# Patient Record
Sex: FEMALE | Race: WHITE | NOT HISPANIC OR LATINO | URBAN - METROPOLITAN AREA
[De-identification: names, ages, dates, MRNs, and addresses within clinical notes are randomized per-mention and may not be internally consistent; named-entity substitution may affect disease eponyms.]

---

## 2023-10-05 ENCOUNTER — TELEPHONE (OUTPATIENT)
Dept: PRIMARY CARE | Facility: CLINIC | Age: 38
End: 2023-10-05
Payer: COMMERCIAL

## 2023-10-05 DIAGNOSIS — F98.8 ATTENTION DEFICIT DISORDER, UNSPECIFIED HYPERACTIVITY PRESENCE: Primary | ICD-10-CM

## 2023-10-05 RX ORDER — DEXTROAMPHETAMINE SACCHARATE, AMPHETAMINE ASPARTATE MONOHYDRATE, DEXTROAMPHETAMINE SULFATE AND AMPHETAMINE SULFATE 6.25; 6.25; 6.25; 6.25 MG/1; MG/1; MG/1; MG/1
25 CAPSULE, EXTENDED RELEASE ORAL EVERY MORNING
Qty: 90 CAPSULE | Refills: 0 | Status: SHIPPED | OUTPATIENT
Start: 2023-10-05 | End: 2023-10-06 | Stop reason: SDUPTHER

## 2023-10-05 RX ORDER — DEXTROAMPHETAMINE SACCHARATE, AMPHETAMINE ASPARTATE MONOHYDRATE, DEXTROAMPHETAMINE SULFATE AND AMPHETAMINE SULFATE 6.25; 6.25; 6.25; 6.25 MG/1; MG/1; MG/1; MG/1
25 CAPSULE, EXTENDED RELEASE ORAL EVERY MORNING
COMMUNITY
End: 2023-10-05 | Stop reason: SDUPTHER

## 2023-10-05 NOTE — TELEPHONE ENCOUNTER
Pt called for refill:  Adderall 25 mg every day  [facility]    Pt took last pill this morning.    *pt scheduled appt. 10/9/23

## 2023-10-06 DIAGNOSIS — F98.8 ATTENTION DEFICIT DISORDER, UNSPECIFIED HYPERACTIVITY PRESENCE: ICD-10-CM

## 2023-10-06 PROBLEM — J45.990 EXERCISE-INDUCED ASTHMA (HHS-HCC): Status: ACTIVE | Noted: 2023-10-06

## 2023-10-06 PROBLEM — K21.9 GERD (GASTROESOPHAGEAL REFLUX DISEASE): Status: ACTIVE | Noted: 2023-10-06

## 2023-10-06 PROBLEM — D22.71 MELANOCYTIC NEVUS OF RIGHT LOWER EXTREMITY: Status: ACTIVE | Noted: 2023-10-06

## 2023-10-06 PROBLEM — E66.3 OVERWEIGHT WITH BODY MASS INDEX (BMI) OF 28 TO 28.9 IN ADULT: Status: ACTIVE | Noted: 2023-10-06

## 2023-10-06 PROBLEM — K76.0 FATTY LIVER: Status: ACTIVE | Noted: 2023-10-06

## 2023-10-06 PROBLEM — K62.89 RECTAL PAIN: Status: ACTIVE | Noted: 2023-10-06

## 2023-10-06 PROBLEM — I10 BENIGN ESSENTIAL HYPERTENSION: Status: ACTIVE | Noted: 2023-10-06

## 2023-10-06 PROBLEM — G43.909 MIGRAINE: Status: ACTIVE | Noted: 2023-10-06

## 2023-10-06 RX ORDER — ALBUTEROL SULFATE 90 UG/1
1-2 AEROSOL, METERED RESPIRATORY (INHALATION) EVERY 4 HOURS PRN
COMMUNITY
Start: 2023-01-16 | End: 2024-01-15 | Stop reason: SDUPTHER

## 2023-10-06 RX ORDER — DEXTROAMPHETAMINE SACCHARATE, AMPHETAMINE ASPARTATE MONOHYDRATE, DEXTROAMPHETAMINE SULFATE AND AMPHETAMINE SULFATE 6.25; 6.25; 6.25; 6.25 MG/1; MG/1; MG/1; MG/1
25 CAPSULE, EXTENDED RELEASE ORAL EVERY MORNING
Qty: 30 CAPSULE | Refills: 0 | Status: SHIPPED | OUTPATIENT
Start: 2023-10-06 | End: 2023-11-06 | Stop reason: SDUPTHER

## 2023-10-06 RX ORDER — DEXTROAMPHETAMINE SACCHARATE, AMPHETAMINE ASPARTATE MONOHYDRATE, DEXTROAMPHETAMINE SULFATE AND AMPHETAMINE SULFATE 6.25; 6.25; 6.25; 6.25 MG/1; MG/1; MG/1; MG/1
25 CAPSULE, EXTENDED RELEASE ORAL EVERY MORNING
Qty: 90 CAPSULE | Refills: 0 | OUTPATIENT
Start: 2023-10-06

## 2023-10-06 RX ORDER — RIZATRIPTAN BENZOATE 10 MG/1
TABLET ORAL
COMMUNITY
Start: 2017-06-12

## 2023-10-06 RX ORDER — PANTOPRAZOLE SODIUM 40 MG/1
40 TABLET, DELAYED RELEASE ORAL DAILY
COMMUNITY
End: 2024-04-15

## 2023-10-06 RX ORDER — METOPROLOL SUCCINATE 25 MG/1
1 TABLET, EXTENDED RELEASE ORAL DAILY
COMMUNITY
Start: 2021-01-20

## 2023-10-06 NOTE — TELEPHONE ENCOUNTER
[facility] stating [facility] [place] [facility] not [facility] but [facility] [place] [facility].    pt with mid abd pain, now RLQ, concern for AP, will get labs, CT A/P

## 2023-10-11 ENCOUNTER — OFFICE VISIT (OUTPATIENT)
Dept: PRIMARY CARE | Facility: CLINIC | Age: 38
End: 2023-10-11
Payer: COMMERCIAL

## 2023-10-11 ENCOUNTER — LAB (OUTPATIENT)
Dept: LAB | Facility: LAB | Age: 38
End: 2023-10-11
Payer: COMMERCIAL

## 2023-10-11 VITALS
HEART RATE: 92 BPM | TEMPERATURE: 97.2 F | BODY MASS INDEX: 30.41 KG/M2 | SYSTOLIC BLOOD PRESSURE: 122 MMHG | DIASTOLIC BLOOD PRESSURE: 90 MMHG | HEIGHT: 63 IN | WEIGHT: 171.6 LBS

## 2023-10-11 DIAGNOSIS — F98.8 ATTENTION DEFICIT DISORDER, UNSPECIFIED HYPERACTIVITY PRESENCE: ICD-10-CM

## 2023-10-11 DIAGNOSIS — R63.5 WEIGHT GAIN: ICD-10-CM

## 2023-10-11 DIAGNOSIS — F98.8 ATTENTION DEFICIT DISORDER, UNSPECIFIED HYPERACTIVITY PRESENCE: Primary | ICD-10-CM

## 2023-10-11 DIAGNOSIS — Z23 NEED FOR INFLUENZA VACCINATION: ICD-10-CM

## 2023-10-11 DIAGNOSIS — Z00.00 HEALTHCARE MAINTENANCE: ICD-10-CM

## 2023-10-11 DIAGNOSIS — I10 BENIGN ESSENTIAL HYPERTENSION: ICD-10-CM

## 2023-10-11 PROCEDURE — 3080F DIAST BP >= 90 MM HG: CPT | Performed by: INTERNAL MEDICINE

## 2023-10-11 PROCEDURE — 99214 OFFICE O/P EST MOD 30 MIN: CPT | Performed by: INTERNAL MEDICINE

## 2023-10-11 PROCEDURE — 3074F SYST BP LT 130 MM HG: CPT | Performed by: INTERNAL MEDICINE

## 2023-10-11 PROCEDURE — 90471 IMMUNIZATION ADMIN: CPT | Performed by: INTERNAL MEDICINE

## 2023-10-11 PROCEDURE — 90686 IIV4 VACC NO PRSV 0.5 ML IM: CPT | Performed by: INTERNAL MEDICINE

## 2023-10-11 PROCEDURE — 80324 DRUG SCREEN AMPHETAMINES 1/2: CPT

## 2023-10-11 PROCEDURE — 80307 DRUG TEST PRSMV CHEM ANLYZR: CPT

## 2023-10-11 ASSESSMENT — ENCOUNTER SYMPTOMS
NAUSEA: 0
CHILLS: 0
UNEXPECTED WEIGHT CHANGE: 1
SHORTNESS OF BREATH: 0
ABDOMINAL PAIN: 0
VOMITING: 0
CONSTIPATION: 0
FATIGUE: 0
ARTHRALGIAS: 0
DIARRHEA: 0
CONFUSION: 0
HEMATURIA: 0
PALPITATIONS: 0
DYSURIA: 0
LIGHT-HEADEDNESS: 0
COUGH: 1
DIZZINESS: 0
FEVER: 0
SORE THROAT: 0
HEADACHES: 0
BACK PAIN: 0

## 2023-10-11 NOTE — PROGRESS NOTES
Subjective   Patient ID: Trisha Taylor is a 38 y.o. female who presents for Follow-up.    HPI     History reviewed and updated.  She had trouble filling her last Rx for Adderall due to shortage at pharmacy.  She missed a few doses and felt terrible - was exhausted and could not focus.  Does not need refill today.    Has been exercising regularly and follows low calorie diet - she is frustrated because she is not losing weight, has actually been gaining weight.     Has been many years since she's had fasting labs.    Would like flu shot today.    OARRS:  Allyson Valle MD on 10/11/2023  5:15 PM  I have personally reviewed the OARRS report for Trisha Taylor. I have considered the risks of abuse, dependence, addiction and diversion and I believe that it is clinically appropriate for Trisha Taylor to be prescribed this medication    Is the patient prescribed a combination of a benzodiazepine and opioid?  No    Last Urine Drug Screen / ordered today: Yes  No results found for this or any previous visit (from the past 8760 hour(s)).            Controlled Substance Agreement:  Date of the Last Agreement: 10/11/2023  Reviewed Controlled Substance Agreement including but not limited to the benefits, risks, and alternatives to treatment with a Controlled Substance medication(s).    Stimulants:   What is the patient's goal of therapy? Improved concentration and focus  Is this being achieved with current treatment? yes    Activities of Daily Living:   Is your overall impression that this patient is benefiting (symptom reduction outweighs side effects) from stimulant therapy? Yes     1. Physical Functioning: Better  2. Family Relationship: Better  3. Social Relationship: Better  4. Mood: Better  5. Sleep Patterns: Same  6. Overall Function: Better      Review of Systems   Constitutional:  Positive for unexpected weight change. Negative for chills, fatigue and fever.   HENT:  Negative for sore throat.    Respiratory:  Positive for  "cough. Negative for shortness of breath.    Cardiovascular:  Negative for chest pain, palpitations and leg swelling.   Gastrointestinal:  Negative for abdominal pain, constipation, diarrhea, nausea and vomiting.   Genitourinary:  Negative for dysuria and hematuria.   Musculoskeletal:  Negative for arthralgias, back pain and gait problem.   Skin:  Negative for rash.   Neurological:  Negative for dizziness, light-headedness and headaches.   Psychiatric/Behavioral:  Negative for confusion.        Objective   /90   Pulse 92   Temp 36.2 °C (97.2 °F) (Temporal)   Ht 1.6 m (5' 3\")   Wt 77.8 kg (171 lb 9.6 oz)   BMI 30.40 kg/m²     Physical Exam  Constitutional:       Appearance: Normal appearance.   HENT:      Head: Normocephalic and atraumatic.   Cardiovascular:      Rate and Rhythm: Normal rate and regular rhythm.      Pulses: Normal pulses.   Pulmonary:      Effort: Pulmonary effort is normal. No respiratory distress.      Breath sounds: Normal breath sounds. No wheezing.   Musculoskeletal:      Right lower leg: No edema.      Left lower leg: No edema.   Skin:     Findings: No rash.   Neurological:      General: No focal deficit present.      Mental Status: She is alert and oriented to person, place, and time. Mental status is at baseline.   Psychiatric:         Mood and Affect: Mood normal.         Behavior: Behavior normal.         Thought Content: Thought content normal.         Judgment: Judgment normal.         Assessment/Plan   Problem List Items Addressed This Visit             ICD-10-CM    Attention deficit disorder (ADD) - Primary F98.8    Relevant Orders    Drug Screen, Urine With Reflex to Confirmation     Other Visit Diagnoses         Codes    Need for influenza vaccination     Z23    Healthcare maintenance     Z00.00    Relevant Orders    Lipid Panel    Comprehensive Metabolic Panel    CBC and Auto Differential    TSH with reflex to Free T4 if abnormal          Attention Deficit Disorder - has " been on Adderall for several years with good results.  Controlled Substance Agreement completed 10/11/23.  OARRS reviewed.  Urine drug screen today.  Does not need refill today - advised to call when she is close to needing a refill.    Difficulty losing weight - check thyroid, basic labs.    Hypertension - diastolic is borderline, continue current medication and efforts with exercise, heart healthy nutrition.    Health maintenance - check CBC, CMP, FLP.    Flu shot today.    Follow-up in 90 days and PRN.

## 2023-10-12 LAB
AMPHETAMINES UR QL SCN: ABNORMAL
BARBITURATES UR QL SCN: ABNORMAL
BENZODIAZ UR QL SCN: ABNORMAL
BZE UR QL SCN: ABNORMAL
CANNABINOIDS UR QL SCN: ABNORMAL
FENTANYL+NORFENTANYL UR QL SCN: ABNORMAL
OPIATES UR QL SCN: ABNORMAL
OXYCODONE+OXYMORPHONE UR QL SCN: ABNORMAL
PCP UR QL SCN: ABNORMAL

## 2023-10-15 LAB
AMPHET UR-MCNC: >5000 NG/ML
MDA UR-MCNC: <200 NG/ML
MDEA UR-MCNC: <200 NG/ML
MDMA UR-MCNC: <200 NG/ML
METHAMPHET UR-MCNC: <200 NG/ML
PHENTERMINE UR CFM-MCNC: <200 NG/ML

## 2023-11-06 ENCOUNTER — TELEPHONE (OUTPATIENT)
Dept: PRIMARY CARE | Facility: CLINIC | Age: 38
End: 2023-11-06
Payer: COMMERCIAL

## 2023-11-06 DIAGNOSIS — F98.8 ATTENTION DEFICIT DISORDER, UNSPECIFIED HYPERACTIVITY PRESENCE: ICD-10-CM

## 2023-11-06 RX ORDER — DEXTROAMPHETAMINE SACCHARATE, AMPHETAMINE ASPARTATE MONOHYDRATE, DEXTROAMPHETAMINE SULFATE AND AMPHETAMINE SULFATE 6.25; 6.25; 6.25; 6.25 MG/1; MG/1; MG/1; MG/1
25 CAPSULE, EXTENDED RELEASE ORAL EVERY MORNING
Qty: 30 CAPSULE | Refills: 0 | Status: SHIPPED | OUTPATIENT
Start: 2023-12-06 | End: 2024-02-08 | Stop reason: WASHOUT

## 2023-11-06 RX ORDER — DEXTROAMPHETAMINE SACCHARATE, AMPHETAMINE ASPARTATE MONOHYDRATE, DEXTROAMPHETAMINE SULFATE AND AMPHETAMINE SULFATE 6.25; 6.25; 6.25; 6.25 MG/1; MG/1; MG/1; MG/1
25 CAPSULE, EXTENDED RELEASE ORAL EVERY MORNING
Qty: 30 CAPSULE | Refills: 0 | Status: SHIPPED | OUTPATIENT
Start: 2023-11-06 | End: 2024-01-08 | Stop reason: SDUPTHER

## 2023-11-06 NOTE — TELEPHONE ENCOUNTER
Patient called requesting rx refill-Adderall XR 25 mg #30 day supply.  [phone]  Patient phone [phone].

## 2023-11-11 LAB
NON-UH HIE A/G RATIO: 1.3
NON-UH HIE ALB: 4.1 G/DL (ref 3.4–5)
NON-UH HIE ALK PHOS: 78 UNIT/L (ref 45–117)
NON-UH HIE BASO COUNT: 0.08 X1000 (ref 0–0.2)
NON-UH HIE BASOS %: 0.7 %
NON-UH HIE BILIRUBIN, TOTAL: 0.6 MG/DL (ref 0.3–1.2)
NON-UH HIE BUN/CREAT RATIO: 21.4
NON-UH HIE BUN: 15 MG/DL (ref 9–23)
NON-UH HIE CALCIUM: 9.6 MG/DL (ref 8.7–10.4)
NON-UH HIE CALCULATED LDL CHOLESTEROL: 98 MG/DL (ref 60–130)
NON-UH HIE CALCULATED OSMOLALITY: 277 MOSM/KG (ref 275–295)
NON-UH HIE CHLORIDE: 103 MMOL/L (ref 98–107)
NON-UH HIE CHOLESTEROL: 189 MG/DL (ref 100–200)
NON-UH HIE CO2, VENOUS: 30 MMOL/L (ref 20–31)
NON-UH HIE CREATININE: 0.7 MG/DL (ref 0.5–0.8)
NON-UH HIE DIFF?: NO
NON-UH HIE EOS COUNT: 0.21 X1000 (ref 0–0.5)
NON-UH HIE EOSIN %: 1.7 %
NON-UH HIE GFR AA: >60
NON-UH HIE GLOBULIN: 3.2 G/DL
NON-UH HIE GLOMERULAR FILTRATION RATE: >60 ML/MIN/1.73M?
NON-UH HIE GLUCOSE: 76 MG/DL (ref 74–106)
NON-UH HIE GOT: 22 UNIT/L (ref 15–37)
NON-UH HIE GPT: 18 UNIT/L (ref 10–49)
NON-UH HIE HCT: 44 % (ref 36–46)
NON-UH HIE HDL CHOLESTEROL: 58 MG/DL (ref 40–60)
NON-UH HIE HGB: 14.5 G/DL (ref 12–16)
NON-UH HIE INSTR WBC: 12.1
NON-UH HIE K: 4.2 MMOL/L (ref 3.5–5.1)
NON-UH HIE LYMPH %: 18.6 %
NON-UH HIE LYMPH COUNT: 2.26 X1000 (ref 1.2–4.8)
NON-UH HIE MCH: 28.8 PG (ref 27–34)
NON-UH HIE MCHC: 33 G/DL (ref 32–37)
NON-UH HIE MCV: 87.2 FL (ref 80–100)
NON-UH HIE MONO %: 5.3 %
NON-UH HIE MONO COUNT: 0.64 X1000 (ref 0.1–1)
NON-UH HIE MPV: 7.7 FL (ref 7.4–10.4)
NON-UH HIE NA: 139 MMOL/L (ref 135–145)
NON-UH HIE NEUTROPHIL %: 73.7 %
NON-UH HIE NEUTROPHIL COUNT (ANC): 8.95 X1000 (ref 1.4–8.8)
NON-UH HIE NUCLEATED RBC: 0 /100WBC
NON-UH HIE PLATELET: 318 X10 (ref 150–450)
NON-UH HIE RBC: 5.04 X10 (ref 4.2–5.4)
NON-UH HIE RDW: 13.1 % (ref 11.5–14.5)
NON-UH HIE TOTAL CHOL/HDL CHOL RATIO: 3.3
NON-UH HIE TOTAL PROTEIN: 7.3 G/DL (ref 5.7–8.2)
NON-UH HIE TRIGLYCERIDES: 164 MG/DL (ref 30–150)
NON-UH HIE WBC: 12.1 X10 (ref 4.5–11)

## 2023-11-30 ENCOUNTER — TELEPHONE (OUTPATIENT)
Dept: PRIMARY CARE | Facility: CLINIC | Age: 38
End: 2023-11-30
Payer: COMMERCIAL

## 2023-11-30 DIAGNOSIS — H66.90 OTITIS MEDIA, UNSPECIFIED LATERALITY, UNSPECIFIED OTITIS MEDIA TYPE: Primary | ICD-10-CM

## 2023-11-30 RX ORDER — AZITHROMYCIN 250 MG/1
TABLET, FILM COATED ORAL
Qty: 6 TABLET | Refills: 0 | Status: SHIPPED | OUTPATIENT
Start: 2023-11-30 | End: 2023-12-05

## 2023-11-30 NOTE — TELEPHONE ENCOUNTER
"PT CALLED    She as sick for a couple weeks.  Yesterday - had pop in R ear  Went to urgent care on [address] yesterday  Dx'd with ear infection and sinus infection.  He prescribed amoxicillin 875 mg - 125 mg    Few hrs later - she developed violent vomiting and diarrhea started around 9:30 last night.  She is no longer vomiting .. But still \"pooping water\"  She thinks she had a reaction to it in the past .. But had forgotten.   Can you prescribe another medication?    Please advise.      "

## 2024-01-08 ENCOUNTER — TELEPHONE (OUTPATIENT)
Dept: PRIMARY CARE | Facility: CLINIC | Age: 39
End: 2024-01-08
Payer: COMMERCIAL

## 2024-01-08 DIAGNOSIS — F98.8 ATTENTION DEFICIT DISORDER, UNSPECIFIED HYPERACTIVITY PRESENCE: ICD-10-CM

## 2024-01-08 RX ORDER — DEXTROAMPHETAMINE SACCHARATE, AMPHETAMINE ASPARTATE MONOHYDRATE, DEXTROAMPHETAMINE SULFATE AND AMPHETAMINE SULFATE 6.25; 6.25; 6.25; 6.25 MG/1; MG/1; MG/1; MG/1
25 CAPSULE, EXTENDED RELEASE ORAL EVERY MORNING
Qty: 30 CAPSULE | Refills: 0 | Status: SHIPPED | OUTPATIENT
Start: 2024-01-08 | End: 2024-01-16 | Stop reason: SDUPTHER

## 2024-01-08 NOTE — TELEPHONE ENCOUNTER
Patient called requesting rx refill-amphetamine-dextroamphetamine XR 25 mg.  [facility] [phone].  Patient phone [phone].  Patient has an appointment with Dr. Valle on 1-15-24.

## 2024-01-15 ENCOUNTER — OFFICE VISIT (OUTPATIENT)
Dept: PRIMARY CARE | Facility: CLINIC | Age: 39
End: 2024-01-15
Payer: COMMERCIAL

## 2024-01-15 VITALS
BODY MASS INDEX: 30.63 KG/M2 | WEIGHT: 172.9 LBS | TEMPERATURE: 97.1 F | DIASTOLIC BLOOD PRESSURE: 85 MMHG | HEART RATE: 84 BPM | OXYGEN SATURATION: 93 % | SYSTOLIC BLOOD PRESSURE: 130 MMHG

## 2024-01-15 DIAGNOSIS — R63.5 WEIGHT GAIN: ICD-10-CM

## 2024-01-15 DIAGNOSIS — J45.990 EXERCISE-INDUCED ASTHMA (HHS-HCC): ICD-10-CM

## 2024-01-15 DIAGNOSIS — I10 BENIGN ESSENTIAL HYPERTENSION: Primary | ICD-10-CM

## 2024-01-15 DIAGNOSIS — F98.8 ATTENTION DEFICIT DISORDER, UNSPECIFIED HYPERACTIVITY PRESENCE: ICD-10-CM

## 2024-01-15 PROBLEM — E66.3 OVERWEIGHT WITH BODY MASS INDEX (BMI) OF 28 TO 28.9 IN ADULT: Status: RESOLVED | Noted: 2023-10-06 | Resolved: 2024-01-15

## 2024-01-15 PROCEDURE — 3075F SYST BP GE 130 - 139MM HG: CPT | Performed by: INTERNAL MEDICINE

## 2024-01-15 PROCEDURE — 99214 OFFICE O/P EST MOD 30 MIN: CPT | Performed by: INTERNAL MEDICINE

## 2024-01-15 PROCEDURE — 3079F DIAST BP 80-89 MM HG: CPT | Performed by: INTERNAL MEDICINE

## 2024-01-15 RX ORDER — ALBUTEROL SULFATE 90 UG/1
1-2 AEROSOL, METERED RESPIRATORY (INHALATION) EVERY 4 HOURS PRN
Qty: 18 G | Refills: 2 | Status: SHIPPED | OUTPATIENT
Start: 2024-01-15

## 2024-01-15 ASSESSMENT — PATIENT HEALTH QUESTIONNAIRE - PHQ9
2. FEELING DOWN, DEPRESSED OR HOPELESS: NOT AT ALL
SUM OF ALL RESPONSES TO PHQ9 QUESTIONS 1 & 2: 0
1. LITTLE INTEREST OR PLEASURE IN DOING THINGS: NOT AT ALL

## 2024-01-15 NOTE — PROGRESS NOTES
"Subjective   Follow-up ADD.  Trouble with weight.    HPI   History reviewed and updated.  Stable on Adderall for ADD, however sometimes has bene having some \"brain fog.\"  She sometimes forgets things like birthdays or events for the day.  Household has paper calendar, she forgets to check this.   Also having trouble with weight gain.  She exercises regularly and limits calories to 1500 per day, still gaining weight.     OARRS:  Allyson Valle MD on 1/15/2024  1:27 PM  I have personally reviewed the OARRS report for Trisha Taylor. I have considered the risks of abuse, dependence, addiction and diversion and I believe that it is clinically appropriate for Trisha Taylor to be prescribed this medication    Is the patient prescribed a combination of a benzodiazepine and opioid?  No    Last Urine Drug Screen / ordered today: No  Recent Results (from the past 8760 hour(s))   Amphetamine Confirm, Urine    Collection Time: 10/11/23  3:57 PM   Result Value Ref Range    Methamphetamine Quant, Ur <200 ng/mL    MDA, Urine <200 ng/mL    MDEA, Urine <200 ng/mL    Phentermine,Urine <200 ng/mL    Amphetamines,Urine >5000 ng/mL    MDMA, Urine <200 ng/mL   Drug Screen, Urine With Reflex to Confirmation    Collection Time: 10/11/23  3:57 PM   Result Value Ref Range    Amphetamine Screen, Urine Presumptive Positive (A) Presumptive Negative    Barbiturate Screen, Urine Presumptive Negative Presumptive Negative    Benzodiazepines Screen, Urine Presumptive Negative Presumptive Negative    Cannabinoid Screen, Urine Presumptive Negative Presumptive Negative    Cocaine Metabolite Screen, Urine Presumptive Negative Presumptive Negative    Fentanyl Screen, Urine Presumptive Negative Presumptive Negative    Opiate Screen, Urine Presumptive Negative Presumptive Negative    Oxycodone Screen, Urine Presumptive Negative Presumptive Negative    PCP Screen, Urine Presumptive Negative Presumptive Negative     Results are as expected.         Controlled " Substance Agreement:  Date of the Last Agreement: 10/11/2023  Reviewed Controlled Substance Agreement including but not limited to the benefits, risks, and alternatives to treatment with a Controlled Substance medication(s).    Stimulants:   What is the patient's goal of therapy? Improved focus and concentration  Is this being achieved with current treatment? yes    Activities of Daily Living:   Is your overall impression that this patient is benefiting (symptom reduction outweighs side effects) from stimulant therapy? Yes     1. Physical Functioning: Same  2. Family Relationship: Same  3. Social Relationship: Same  4. Mood: Same  5. Sleep Patterns: Same  6. Overall Function: Same      Review of Systems   Constitutional:  Positive for unexpected weight change. Negative for chills, fatigue and fever.   HENT:  Negative for sore throat.    Respiratory:  Negative for cough and shortness of breath.    Cardiovascular:  Negative for chest pain, palpitations and leg swelling.   Gastrointestinal:  Negative for abdominal pain, constipation, diarrhea, nausea and vomiting.   Genitourinary:  Negative for dysuria and hematuria.   Musculoskeletal:  Negative for arthralgias, back pain and gait problem.   Skin:  Negative for rash.   Neurological:  Negative for dizziness, light-headedness and headaches.   Psychiatric/Behavioral:  Positive for decreased concentration. Negative for confusion.        Objective   /85 (BP Location: Left arm, Patient Position: Sitting, BP Cuff Size: Adult)   Pulse 84   Temp 36.2 °C (97.1 °F) (Temporal)   Wt 78.4 kg (172 lb 14.4 oz)   SpO2 93%   BMI 30.63 kg/m²     Physical Exam  Constitutional:       Appearance: Normal appearance.   HENT:      Head: Normocephalic and atraumatic.   Cardiovascular:      Rate and Rhythm: Normal rate and regular rhythm.      Heart sounds: No murmur heard.  Pulmonary:      Effort: Pulmonary effort is normal. No respiratory distress.      Breath sounds: Normal breath  sounds. No wheezing.   Abdominal:      General: There is no distension.      Palpations: Abdomen is soft.      Tenderness: There is no abdominal tenderness.   Musculoskeletal:      Right lower leg: No edema.      Left lower leg: No edema.   Neurological:      General: No focal deficit present.      Mental Status: She is alert and oriented to person, place, and time. Mental status is at baseline.   Psychiatric:         Mood and Affect: Mood normal.         Behavior: Behavior normal.         Thought Content: Thought content normal.         Judgment: Judgment normal.         Assessment/Plan   Problem List Items Addressed This Visit             ICD-10-CM    Attention deficit disorder (ADD) F98.8    Relevant Medications    amphetamine-dextroamphetamine XR (Adderall XR) 25 mg 24 hr capsule (Start on 2/7/2024)    amphetamine-dextroamphetamine XR (Adderall XR) 25 mg 24 hr capsule (Start on 3/8/2024)    amphetamine-dextroamphetamine XR (Adderall XR) 25 mg 24 hr capsule (Start on 4/7/2024)    Benign essential hypertension - Primary I10    Exercise-induced asthma J45.990    Relevant Medications    albuterol 90 mcg/actuation inhaler     Other Visit Diagnoses         Codes    Weight gain     R63.5    Relevant Orders    TSH with reflex to Free T4 if abnormal    CBC and Auto Differential            Attention Deficit Disorder - has been on Adderall for several years with good results.  Controlled Substance Agreement completed 10/11/23.  OARRS reviewed.  Urine drug screen 10/11/2023.   Refills sent with appropriate fill dates.      Difficulty losing weight - check thyroid, basic labs.    Hypertension - controlled on current medication.     Exercise induced asthma - refill sent for albuterol.      Follow-up in 90 days and PRN.

## 2024-01-16 RX ORDER — DEXTROAMPHETAMINE SACCHARATE, AMPHETAMINE ASPARTATE MONOHYDRATE, DEXTROAMPHETAMINE SULFATE AND AMPHETAMINE SULFATE 6.25; 6.25; 6.25; 6.25 MG/1; MG/1; MG/1; MG/1
25 CAPSULE, EXTENDED RELEASE ORAL EVERY MORNING
Qty: 30 CAPSULE | Refills: 0 | Status: SHIPPED | OUTPATIENT
Start: 2024-02-07 | End: 2024-02-08 | Stop reason: SDUPTHER

## 2024-01-16 RX ORDER — DEXTROAMPHETAMINE SACCHARATE, AMPHETAMINE ASPARTATE MONOHYDRATE, DEXTROAMPHETAMINE SULFATE AND AMPHETAMINE SULFATE 6.25; 6.25; 6.25; 6.25 MG/1; MG/1; MG/1; MG/1
25 CAPSULE, EXTENDED RELEASE ORAL EVERY MORNING
Qty: 30 CAPSULE | Refills: 0 | Status: SHIPPED | OUTPATIENT
Start: 2024-04-07 | End: 2024-02-08 | Stop reason: WASHOUT

## 2024-01-16 RX ORDER — DEXTROAMPHETAMINE SACCHARATE, AMPHETAMINE ASPARTATE MONOHYDRATE, DEXTROAMPHETAMINE SULFATE AND AMPHETAMINE SULFATE 6.25; 6.25; 6.25; 6.25 MG/1; MG/1; MG/1; MG/1
25 CAPSULE, EXTENDED RELEASE ORAL EVERY MORNING
Qty: 30 CAPSULE | Refills: 0 | Status: SHIPPED | OUTPATIENT
Start: 2024-03-08 | End: 2024-02-08 | Stop reason: WASHOUT

## 2024-01-16 ASSESSMENT — ENCOUNTER SYMPTOMS
SHORTNESS OF BREATH: 0
COUGH: 0
HEADACHES: 0
FEVER: 0
ARTHRALGIAS: 0
CONSTIPATION: 0
BACK PAIN: 0
FATIGUE: 0
DYSURIA: 0
UNEXPECTED WEIGHT CHANGE: 1
CHILLS: 0
VOMITING: 0
NAUSEA: 0
DIARRHEA: 0
DECREASED CONCENTRATION: 1
ABDOMINAL PAIN: 0
CONFUSION: 0
DIZZINESS: 0
LIGHT-HEADEDNESS: 0
PALPITATIONS: 0
HEMATURIA: 0
SORE THROAT: 0

## 2024-01-20 LAB
NON-UH HIE BASO COUNT: 0.11 X1000 (ref 0–0.2)
NON-UH HIE BASOS %: 1 %
NON-UH HIE DIFF?: NO
NON-UH HIE EOS COUNT: 0.32 X1000 (ref 0–0.5)
NON-UH HIE EOSIN %: 2.8 %
NON-UH HIE HCT: 42.6 % (ref 36–46)
NON-UH HIE HGB: 14.1 G/DL (ref 12–16)
NON-UH HIE INSTR WBC: 11.5
NON-UH HIE LYMPH %: 14.7 %
NON-UH HIE LYMPH COUNT: 1.68 X1000 (ref 1.2–4.8)
NON-UH HIE MCH: 29.1 PG (ref 27–34)
NON-UH HIE MCHC: 33.1 G/DL (ref 32–37)
NON-UH HIE MCV: 87.7 FL (ref 80–100)
NON-UH HIE MONO %: 5.2 %
NON-UH HIE MONO COUNT: 0.6 X1000 (ref 0.1–1)
NON-UH HIE MPV: 7.7 FL (ref 7.4–10.4)
NON-UH HIE NEUTROPHIL %: 76.3 %
NON-UH HIE NEUTROPHIL COUNT (ANC): 8.74 X1000 (ref 1.4–8.8)
NON-UH HIE NUCLEATED RBC: 0 /100WBC
NON-UH HIE PLATELET: 320 X10 (ref 150–450)
NON-UH HIE RBC: 4.85 X10 (ref 4.2–5.4)
NON-UH HIE RDW: 14 % (ref 11.5–14.5)
NON-UH HIE TSH: 1.42 UIU/ML (ref 0.55–4.78)
NON-UH HIE WBC: 11.5 X10 (ref 4.5–11)

## 2024-02-07 ENCOUNTER — TELEPHONE (OUTPATIENT)
Dept: PRIMARY CARE | Facility: CLINIC | Age: 39
End: 2024-02-07
Payer: COMMERCIAL

## 2024-02-07 NOTE — TELEPHONE ENCOUNTER
Patient wonders if she can go back to getting her adderall for a 90 day supply.  Her insurance pays better if it is done this way.

## 2024-02-08 DIAGNOSIS — F98.8 ATTENTION DEFICIT DISORDER, UNSPECIFIED HYPERACTIVITY PRESENCE: ICD-10-CM

## 2024-02-08 RX ORDER — DEXTROAMPHETAMINE SACCHARATE, AMPHETAMINE ASPARTATE MONOHYDRATE, DEXTROAMPHETAMINE SULFATE AND AMPHETAMINE SULFATE 6.25; 6.25; 6.25; 6.25 MG/1; MG/1; MG/1; MG/1
25 CAPSULE, EXTENDED RELEASE ORAL EVERY MORNING
Qty: 90 CAPSULE | Refills: 0 | Status: SHIPPED | OUTPATIENT
Start: 2024-02-08 | End: 2024-04-23 | Stop reason: SDUPTHER

## 2024-02-20 ENCOUNTER — TELEPHONE (OUTPATIENT)
Dept: PRIMARY CARE | Facility: CLINIC | Age: 39
End: 2024-02-20
Payer: COMMERCIAL

## 2024-02-20 DIAGNOSIS — L50.9 HIVES: Primary | ICD-10-CM

## 2024-02-20 RX ORDER — METHYLPREDNISOLONE 4 MG/1
TABLET ORAL
Qty: 21 TABLET | Refills: 0 | Status: SHIPPED | OUTPATIENT
Start: 2024-02-20 | End: 2024-02-27

## 2024-02-20 NOTE — TELEPHONE ENCOUNTER
Patient got her nose pierced last Saturday.  That evening she developed hives on her arms, legs and torso.  She has been taking benedryl and using an anti itch cream but it is not going away.  She does not think it would be from the piercing since she did not get hives anywhere around it.  Hopes you can call something in for the hives    RX  [facility] [phone]

## 2024-04-14 DIAGNOSIS — K21.9 GASTRO-ESOPHAGEAL REFLUX DISEASE WITHOUT ESOPHAGITIS: ICD-10-CM

## 2024-04-15 ENCOUNTER — TELEPHONE (OUTPATIENT)
Dept: PRIMARY CARE | Facility: CLINIC | Age: 39
End: 2024-04-15

## 2024-04-15 ENCOUNTER — APPOINTMENT (OUTPATIENT)
Dept: PRIMARY CARE | Facility: CLINIC | Age: 39
End: 2024-04-15
Payer: COMMERCIAL

## 2024-04-15 DIAGNOSIS — K21.9 GASTRO-ESOPHAGEAL REFLUX DISEASE WITHOUT ESOPHAGITIS: ICD-10-CM

## 2024-04-15 RX ORDER — PANTOPRAZOLE SODIUM 40 MG/1
40 TABLET, DELAYED RELEASE ORAL DAILY
Qty: 90 TABLET | Refills: 3 | Status: SHIPPED | OUTPATIENT
Start: 2024-04-15 | End: 2024-04-15 | Stop reason: SDUPTHER

## 2024-04-15 RX ORDER — PANTOPRAZOLE SODIUM 40 MG/1
40 TABLET, DELAYED RELEASE ORAL DAILY
Qty: 90 TABLET | Refills: 3 | Status: SHIPPED | OUTPATIENT
Start: 2024-04-15

## 2024-04-15 NOTE — TELEPHONE ENCOUNTER
Pt has rescheduled her PM appt today till next Monday PM @ 3:30. She is requesting a refill on her pantoprazole, Please Advise

## 2024-04-22 ENCOUNTER — OFFICE VISIT (OUTPATIENT)
Dept: PRIMARY CARE | Facility: CLINIC | Age: 39
End: 2024-04-22
Payer: COMMERCIAL

## 2024-04-22 VITALS
TEMPERATURE: 97 F | OXYGEN SATURATION: 91 % | SYSTOLIC BLOOD PRESSURE: 92 MMHG | HEIGHT: 63 IN | DIASTOLIC BLOOD PRESSURE: 69 MMHG | BODY MASS INDEX: 31.75 KG/M2 | HEART RATE: 80 BPM | WEIGHT: 179.2 LBS

## 2024-04-22 DIAGNOSIS — I10 BENIGN ESSENTIAL HYPERTENSION: ICD-10-CM

## 2024-04-22 DIAGNOSIS — R63.5 WEIGHT GAIN: Primary | ICD-10-CM

## 2024-04-22 DIAGNOSIS — K76.0 FATTY LIVER: ICD-10-CM

## 2024-04-22 DIAGNOSIS — F98.8 ATTENTION DEFICIT DISORDER, UNSPECIFIED HYPERACTIVITY PRESENCE: ICD-10-CM

## 2024-04-22 DIAGNOSIS — E66.09 CLASS 1 OBESITY DUE TO EXCESS CALORIES WITH SERIOUS COMORBIDITY AND BODY MASS INDEX (BMI) OF 31.0 TO 31.9 IN ADULT: ICD-10-CM

## 2024-04-22 PROBLEM — E66.811 CLASS 1 OBESITY DUE TO EXCESS CALORIES WITH SERIOUS COMORBIDITY AND BODY MASS INDEX (BMI) OF 31.0 TO 31.9 IN ADULT: Status: ACTIVE | Noted: 2024-04-22

## 2024-04-22 PROCEDURE — 99214 OFFICE O/P EST MOD 30 MIN: CPT | Performed by: INTERNAL MEDICINE

## 2024-04-22 PROCEDURE — 3008F BODY MASS INDEX DOCD: CPT | Performed by: INTERNAL MEDICINE

## 2024-04-22 PROCEDURE — 3078F DIAST BP <80 MM HG: CPT | Performed by: INTERNAL MEDICINE

## 2024-04-22 PROCEDURE — 3074F SYST BP LT 130 MM HG: CPT | Performed by: INTERNAL MEDICINE

## 2024-04-22 ASSESSMENT — ENCOUNTER SYMPTOMS
NAUSEA: 0
AURA: 0
FOCAL SENSORY LOSS: 0
SLURRED SPEECH: 0
DIAPHORESIS: 0
CONFUSION: 0
CLUMSINESS: 0
PALPITATIONS: 0
BACK PAIN: 0
NEUROLOGIC COMPLAINT: 1
FATIGUE: 0
VOMITING: 0
ABDOMINAL PAIN: 0
NEAR-SYNCOPE: 0
FOCAL WEAKNESS: 0
VISUAL CHANGE: 0
LOSS OF BALANCE: 0
VERTIGO: 0
LIGHT-HEADEDNESS: 0
ALTERED MENTAL STATUS: 0
BOWEL INCONTINENCE: 0
SHORTNESS OF BREATH: 0
DIZZINESS: 0
HEADACHES: 0
NECK PAIN: 0
MEMORY LOSS: 0
WEAKNESS: 0
FEVER: 0

## 2024-04-22 ASSESSMENT — PATIENT HEALTH QUESTIONNAIRE - PHQ9
2. FEELING DOWN, DEPRESSED OR HOPELESS: NOT AT ALL
SUM OF ALL RESPONSES TO PHQ9 QUESTIONS 1 AND 2: 0
1. LITTLE INTEREST OR PLEASURE IN DOING THINGS: NOT AT ALL

## 2024-04-22 NOTE — PROGRESS NOTES
CHIEF COMPLAINT  ADD    HISTORY OF PRESENT ILLNESS  Trisha Taylor is a 39 y.o. female presents today for follow up of ADD    Acute Neurological Problem  The patient's pertinent negatives include no altered mental status, clumsiness, focal sensory loss, focal weakness, loss of balance, memory loss, near-syncope, slurred speech, syncope, visual change or weakness. This is a recurrent problem. The current episode started more than 1 year ago. The neurological problem developed gradually. The problem is unchanged. There was no focality noted. Pertinent negatives include no abdominal pain, auditory change, aura, back pain, bladder incontinence, bowel incontinence, chest pain, confusion, diaphoresis, dizziness, fatigue, fever, headaches, light-headedness, nausea, neck pain, palpitations, shortness of breath, vertigo or vomiting. Past treatments include nothing. The treatment provided significant relief.       Stable on current medication.  She's very frustrated with her diet.  She follows a low calorie diet and tracks calories using an renu.  She exercises 1-2 times every day and does not count calories burn towards her deficit.  She is still gaining weight.  Wonders if she might have problem with cortisol.  States obesity runs in family, particularly on her mother's side.  This makes her very upset because she had fatty liver and she knows that the primary treatment is weight loss.     OARRS:  Allyson Valle MD on 4/23/2024  9:22 AM  I have personally reviewed the OARRS report for Trisha Taylor. I have considered the risks of abuse, dependence, addiction and diversion and I believe that it is clinically appropriate for Trisha Taylor to be prescribed this medication    Is the patient prescribed a combination of a benzodiazepine and opioid?  No    Last Urine Drug Screen / ordered today: No  Recent Results (from the past 8760 hour(s))   Amphetamine Confirm, Urine    Collection Time: 10/11/23  3:57 PM   Result Value Ref Range     Methamphetamine Quant, Ur <200 ng/mL    MDA, Urine <200 ng/mL    MDEA, Urine <200 ng/mL    Phentermine,Urine <200 ng/mL    Amphetamines,Urine >5000 ng/mL    MDMA, Urine <200 ng/mL   Drug Screen, Urine With Reflex to Confirmation    Collection Time: 10/11/23  3:57 PM   Result Value Ref Range    Amphetamine Screen, Urine Presumptive Positive (A) Presumptive Negative    Barbiturate Screen, Urine Presumptive Negative Presumptive Negative    Benzodiazepines Screen, Urine Presumptive Negative Presumptive Negative    Cannabinoid Screen, Urine Presumptive Negative Presumptive Negative    Cocaine Metabolite Screen, Urine Presumptive Negative Presumptive Negative    Fentanyl Screen, Urine Presumptive Negative Presumptive Negative    Opiate Screen, Urine Presumptive Negative Presumptive Negative    Oxycodone Screen, Urine Presumptive Negative Presumptive Negative    PCP Screen, Urine Presumptive Negative Presumptive Negative     Results are as expected.         Controlled Substance Agreement:  Date of the Last Agreement: 10/11/23  Reviewed Controlled Substance Agreement including but not limited to the benefits, risks, and alternatives to treatment with a Controlled Substance medication(s).    Stimulants:   What is the patient's goal of therapy? Improved focus and concentration  Is this being achieved with current treatment? yes    Activities of Daily Living:   Is your overall impression that this patient is benefiting (symptom reduction outweighs side effects) from stimulant therapy? Yes     1. Physical Functioning: Same  2. Family Relationship: Same  3. Social Relationship: Same  4. Mood: Same  5. Sleep Patterns: Same  6. Overall Function: Same      REVIEW OF SYSTEMS  Review of Systems   Constitutional:  Negative for diaphoresis, fatigue and fever.   Respiratory:  Negative for shortness of breath.    Cardiovascular:  Negative for chest pain, palpitations and near-syncope.   Gastrointestinal:  Negative for abdominal pain,  "bowel incontinence, nausea and vomiting.   Genitourinary:  Negative for bladder incontinence.   Musculoskeletal:  Negative for back pain and neck pain.   Neurological:  Negative for dizziness, vertigo, focal weakness, syncope, weakness, light-headedness, headaches and loss of balance.   Psychiatric/Behavioral:  Negative for confusion and memory loss.        ALLERGIES  Levofloxacin, Sulfa (sulfonamide antibiotics), and Sulfamethoxazole-trimethoprim    MEDICATIONS  Current Outpatient Medications   Medication Instructions    albuterol 90 mcg/actuation inhaler 1-2 puffs, inhalation, Every 4 hours PRN    amphetamine-dextroamphetamine XR (Adderall XR) 25 mg 24 hr capsule 25 mg, oral, Every morning    L. acidophilus/Bifid. animalis 32 billion cell capsule oral    metoprolol succinate XL (Toprol-XL) 25 mg 24 hr tablet 1 tablet, oral, Daily    multivitamin-Ca-iron-minerals (Tab-A-Alice Womens) 27-0.4 mg tablet 1 tablet, oral, Daily    pantoprazole (PROTONIX) 40 mg, oral, Daily    rizatriptan (Maxalt) 10 mg tablet oral,  TAKE 1 TABLET AT ONSET OF HEADACHE. MAY REPEAT EVERY 2 HOURS AS NEEDED. MAXIMUM 3 TABLETS IN 24 HOURS.<BR>    tirzepatide (weight loss) (ZEPBOUND) 2.5 mg, subcutaneous, Every 7 days       TOBACCO USE  Social History     Tobacco Use   Smoking Status Every Day    Types: Cigarettes   Smokeless Tobacco Never       DEPRESSION SCREEN  Over the past 2 weeks, how often have you been bothered by any of the following problems?  Little interest or pleasure in doing things: Not at all  Feeling down, depressed, or hopeless: Not at all    SURGICAL HISTORY  Past Surgical History:  02/26/2014: MOUTH SURGERY      Comment:  Oral Surgery Tooth Extraction  02/29/2020: OTHER SURGICAL HISTORY      Comment:  Esophagogastroduodenoscopy  01/20/2021: OTHER SURGICAL HISTORY      Comment:  Endometrial ablation       OBJECTIVE    BP 92/69   Pulse 80   Temp 36.1 °C (97 °F)   Ht 1.6 m (5' 3\")   Wt 81.3 kg (179 lb 3.2 oz)   SpO2 91%   " "BMI 31.74 kg/m²    BMI: Estimated body mass index is 31.74 kg/m² as calculated from the following:    Height as of this encounter: 1.6 m (5' 3\").    Weight as of this encounter: 81.3 kg (179 lb 3.2 oz).    BP Readings from Last 3 Encounters:   04/22/24 92/69   01/15/24 130/85   10/11/23 122/90      Wt Readings from Last 3 Encounters:   04/22/24 81.3 kg (179 lb 3.2 oz)   01/15/24 78.4 kg (172 lb 14.4 oz)   10/11/23 77.8 kg (171 lb 9.6 oz)       TSH normal 1/20/24 ([facility]).    PHYSICAL EXAM  Physical Exam  Constitutional:       Appearance: Normal appearance.   HENT:      Head: Normocephalic and atraumatic.   Cardiovascular:      Rate and Rhythm: Normal rate and regular rhythm.      Heart sounds: No murmur heard.  Pulmonary:      Effort: Pulmonary effort is normal. No respiratory distress.      Breath sounds: Normal breath sounds. No wheezing.   Abdominal:      General: There is no distension.      Palpations: Abdomen is soft.      Tenderness: There is no abdominal tenderness.   Musculoskeletal:      Right lower leg: No edema.      Left lower leg: No edema.   Neurological:      General: No focal deficit present.      Mental Status: She is alert and oriented to person, place, and time. Mental status is at baseline.   Psychiatric:         Mood and Affect: Mood normal.         Behavior: Behavior normal.         Thought Content: Thought content normal.         Judgment: Judgment normal.          ASSESSMENT AND PLAN  Assessment/Plan   Problem List Items Addressed This Visit       Attention deficit disorder (ADD)    Relevant Medications    amphetamine-dextroamphetamine XR (Adderall XR) 25 mg 24 hr capsule    Benign essential hypertension    Fatty liver    Relevant Medications    tirzepatide, weight loss, (Zepbound) 2.5 mg/0.5 mL injection    Class 1 obesity due to excess calories with serious comorbidity and body mass index (BMI) of 31.0 to 31.9 in adult    Relevant Medications    tirzepatide, weight loss, (Zepbound) " 2.5 mg/0.5 mL injection    Other Relevant Orders    Cortisol     Other Visit Diagnoses       Weight gain    -  Primary    Relevant Medications    tirzepatide, weight loss, (Zepbound) 2.5 mg/0.5 mL injection    Other Relevant Orders    Cortisol            Attention Deficit Disorder - has been on Adderall for several years with good results.  Controlled Substance Agreement completed 10/11/23.  OARRS reviewed.  Urine drug screen 10/11/2023.   Refills sent.     Obesity, BMI 31 with fatty liver, difficulty losing weight - TSH normal Jan 2024.  She is following a low calorie diet and exercises regularly.  Obesity runs in family.  She is very motivated to lose weight to help her fatty liver.  Check AM cortisol screening.  Rx sent for Zepbound.     Hypertension - controlled on current medication.     Follow-up in 90 days.

## 2024-04-23 DIAGNOSIS — R63.5 WEIGHT GAIN: ICD-10-CM

## 2024-04-23 DIAGNOSIS — E66.09 CLASS 1 OBESITY DUE TO EXCESS CALORIES WITH SERIOUS COMORBIDITY AND BODY MASS INDEX (BMI) OF 31.0 TO 31.9 IN ADULT: Primary | ICD-10-CM

## 2024-04-23 DIAGNOSIS — E66.09 CLASS 1 OBESITY DUE TO EXCESS CALORIES WITH SERIOUS COMORBIDITY AND BODY MASS INDEX (BMI) OF 31.0 TO 31.9 IN ADULT: ICD-10-CM

## 2024-04-23 DIAGNOSIS — I10 BENIGN ESSENTIAL HYPERTENSION: ICD-10-CM

## 2024-04-23 DIAGNOSIS — K76.0 FATTY LIVER: ICD-10-CM

## 2024-04-23 RX ORDER — LIRAGLUTIDE 6 MG/ML
INJECTION, SOLUTION SUBCUTANEOUS
Qty: 3 ML | Refills: 0 | Status: SHIPPED | OUTPATIENT
Start: 2024-04-23

## 2024-04-23 RX ORDER — SEMAGLUTIDE 0.25 MG/.5ML
0.25 INJECTION, SOLUTION SUBCUTANEOUS
Qty: 2 ML | Refills: 0 | Status: SHIPPED | OUTPATIENT
Start: 2024-04-28 | End: 2024-05-01 | Stop reason: SDUPTHER

## 2024-04-23 RX ORDER — DEXTROAMPHETAMINE SACCHARATE, AMPHETAMINE ASPARTATE MONOHYDRATE, DEXTROAMPHETAMINE SULFATE AND AMPHETAMINE SULFATE 6.25; 6.25; 6.25; 6.25 MG/1; MG/1; MG/1; MG/1
25 CAPSULE, EXTENDED RELEASE ORAL EVERY MORNING
Qty: 90 CAPSULE | Refills: 0 | Status: SHIPPED | OUTPATIENT
Start: 2024-04-23

## 2024-04-23 ASSESSMENT — ENCOUNTER SYMPTOMS
AURA: 0
FEVER: 0
VOMITING: 0
SLURRED SPEECH: 0
SHORTNESS OF BREATH: 0
NECK PAIN: 0
LIGHT-HEADEDNESS: 0
FOCAL SENSORY LOSS: 0
FATIGUE: 0
DIAPHORESIS: 0
ALTERED MENTAL STATUS: 0
NEAR-SYNCOPE: 0
PALPITATIONS: 0
HEADACHES: 0
WEAKNESS: 0
CONFUSION: 0
DIZZINESS: 0
VISUAL CHANGE: 0
NEUROLOGIC COMPLAINT: 1
LOSS OF BALANCE: 0
ABDOMINAL PAIN: 0
VERTIGO: 0
FOCAL WEAKNESS: 0
BACK PAIN: 0
NAUSEA: 0
BOWEL INCONTINENCE: 0
MEMORY LOSS: 0
CLUMSINESS: 0

## 2024-05-01 DIAGNOSIS — K76.0 FATTY LIVER: ICD-10-CM

## 2024-05-01 DIAGNOSIS — E66.09 CLASS 1 OBESITY DUE TO EXCESS CALORIES WITH SERIOUS COMORBIDITY AND BODY MASS INDEX (BMI) OF 31.0 TO 31.9 IN ADULT: ICD-10-CM

## 2024-05-01 DIAGNOSIS — I10 BENIGN ESSENTIAL HYPERTENSION: ICD-10-CM

## 2024-05-01 RX ORDER — SEMAGLUTIDE 0.25 MG/.5ML
0.25 INJECTION, SOLUTION SUBCUTANEOUS
Qty: 2 ML | Refills: 0 | Status: SHIPPED | OUTPATIENT
Start: 2024-05-05 | End: 2024-05-27

## 2024-05-17 ENCOUNTER — LAB (OUTPATIENT)
Dept: LAB | Facility: LAB | Age: 39
End: 2024-05-17
Payer: COMMERCIAL

## 2024-05-17 DIAGNOSIS — R63.5 WEIGHT GAIN: ICD-10-CM

## 2024-05-17 DIAGNOSIS — E66.09 CLASS 1 OBESITY DUE TO EXCESS CALORIES WITH SERIOUS COMORBIDITY AND BODY MASS INDEX (BMI) OF 31.0 TO 31.9 IN ADULT: ICD-10-CM

## 2024-05-17 LAB — CORTIS SERPL-MCNC: 11 UG/DL (ref 2.5–20)

## 2024-05-17 PROCEDURE — 82533 TOTAL CORTISOL: CPT

## 2024-05-17 PROCEDURE — 36415 COLL VENOUS BLD VENIPUNCTURE: CPT

## 2024-05-22 DIAGNOSIS — E66.09 CLASS 1 OBESITY DUE TO EXCESS CALORIES WITH SERIOUS COMORBIDITY AND BODY MASS INDEX (BMI) OF 31.0 TO 31.9 IN ADULT: Primary | ICD-10-CM

## 2024-05-22 RX ORDER — SEMAGLUTIDE 0.5 MG/.5ML
0.5 INJECTION, SOLUTION SUBCUTANEOUS
Qty: 2 ML | Refills: 0 | Status: SHIPPED | OUTPATIENT
Start: 2024-05-26 | End: 2024-06-17

## 2024-06-26 DIAGNOSIS — E66.09 CLASS 1 OBESITY DUE TO EXCESS CALORIES WITH SERIOUS COMORBIDITY AND BODY MASS INDEX (BMI) OF 31.0 TO 31.9 IN ADULT: Primary | ICD-10-CM

## 2024-07-09 DIAGNOSIS — I10 ESSENTIAL (PRIMARY) HYPERTENSION: ICD-10-CM

## 2024-07-09 RX ORDER — METOPROLOL SUCCINATE 25 MG/1
25 TABLET, EXTENDED RELEASE ORAL DAILY
Qty: 90 TABLET | Refills: 3 | Status: SHIPPED | OUTPATIENT
Start: 2024-07-09

## 2024-07-22 ENCOUNTER — APPOINTMENT (OUTPATIENT)
Dept: PRIMARY CARE | Facility: CLINIC | Age: 39
End: 2024-07-22
Payer: COMMERCIAL

## 2024-07-22 VITALS
OXYGEN SATURATION: 98 % | HEIGHT: 63 IN | TEMPERATURE: 97.2 F | DIASTOLIC BLOOD PRESSURE: 77 MMHG | BODY MASS INDEX: 28.58 KG/M2 | WEIGHT: 161.31 LBS | HEART RATE: 79 BPM | SYSTOLIC BLOOD PRESSURE: 115 MMHG

## 2024-07-22 DIAGNOSIS — I10 BENIGN ESSENTIAL HYPERTENSION: ICD-10-CM

## 2024-07-22 DIAGNOSIS — E66.09 CLASS 1 OBESITY DUE TO EXCESS CALORIES WITH SERIOUS COMORBIDITY AND BODY MASS INDEX (BMI) OF 31.0 TO 31.9 IN ADULT: ICD-10-CM

## 2024-07-22 DIAGNOSIS — F98.8 ATTENTION DEFICIT DISORDER, UNSPECIFIED HYPERACTIVITY PRESENCE: Primary | ICD-10-CM

## 2024-07-22 DIAGNOSIS — G43.809 OTHER MIGRAINE WITHOUT STATUS MIGRAINOSUS, NOT INTRACTABLE: ICD-10-CM

## 2024-07-22 DIAGNOSIS — Z79.899 HIGH RISK MEDICATION USE: ICD-10-CM

## 2024-07-22 PROCEDURE — 3074F SYST BP LT 130 MM HG: CPT | Performed by: INTERNAL MEDICINE

## 2024-07-22 PROCEDURE — 3008F BODY MASS INDEX DOCD: CPT | Performed by: INTERNAL MEDICINE

## 2024-07-22 PROCEDURE — 99214 OFFICE O/P EST MOD 30 MIN: CPT | Performed by: INTERNAL MEDICINE

## 2024-07-22 PROCEDURE — 3078F DIAST BP <80 MM HG: CPT | Performed by: INTERNAL MEDICINE

## 2024-07-22 RX ORDER — RIZATRIPTAN BENZOATE 10 MG/1
10 TABLET ORAL ONCE AS NEEDED
Qty: 9 TABLET | Refills: 11 | Status: SHIPPED | OUTPATIENT
Start: 2024-07-22 | End: 2024-07-22

## 2024-07-22 RX ORDER — DEXTROAMPHETAMINE SACCHARATE, AMPHETAMINE ASPARTATE MONOHYDRATE, DEXTROAMPHETAMINE SULFATE AND AMPHETAMINE SULFATE 6.25; 6.25; 6.25; 6.25 MG/1; MG/1; MG/1; MG/1
25 CAPSULE, EXTENDED RELEASE ORAL EVERY MORNING
Qty: 90 CAPSULE | Refills: 0 | Status: SHIPPED | OUTPATIENT
Start: 2024-08-03

## 2024-07-22 RX ORDER — RIZATRIPTAN BENZOATE 10 MG/1
10 TABLET ORAL ONCE AS NEEDED
Qty: 9 TABLET | Refills: 11 | Status: SHIPPED | OUTPATIENT
Start: 2024-07-22

## 2024-07-22 ASSESSMENT — ENCOUNTER SYMPTOMS
ABDOMINAL PAIN: 0
FEVER: 0
LIGHT-HEADEDNESS: 0
DIZZINESS: 0
HEADACHES: 0
SHORTNESS OF BREATH: 0
NECK PAIN: 0
CONFUSION: 0
VOMITING: 0
PALPITATIONS: 0
FATIGUE: 0
WEAKNESS: 0
NAUSEA: 0
BACK PAIN: 0
DIAPHORESIS: 0
ROS GI COMMENTS: OCCASIONAL HEARTBURN

## 2024-07-22 NOTE — PROGRESS NOTES
CHIEF COMPLAINT  ADD    HISTORY OF PRESENT ILLNESS  Trisha Taylor is a 39 y.o. female presents today for follow up of ADD    HPI    Trisha is using semaglutide from compounding pharmacy and lost 20 lbs  She has some occasional GERD, but otherwise is doing well and is happy with the medication.  She is also due for refill soon on her Adderall.      OARRS:  Allyson Valle MD on 7/22/2024  7:57 AM  I have personally reviewed the OARRS report for Trisha Taylor. I have considered the risks of abuse, dependence, addiction and diversion and I believe that it is clinically appropriate for Trisha Taylor to be prescribed this medication    5/5 Adderall #90     Is the patient prescribed a combination of a benzodiazepine and opioid?  Yes, I feel it is clincially indicated to continue the medication and have discussed with the patient risks/benefits/alternatives.    Last Urine Drug Screen / ordered today: No  Recent Results (from the past 8760 hour(s))   Amphetamine Confirm, Urine    Collection Time: 10/11/23  3:57 PM   Result Value Ref Range    Methamphetamine Quant, Ur <200 ng/mL    MDA, Urine <200 ng/mL    MDEA, Urine <200 ng/mL    Phentermine,Urine <200 ng/mL    Amphetamines,Urine >5000 ng/mL    MDMA, Urine <200 ng/mL   Drug Screen, Urine With Reflex to Confirmation    Collection Time: 10/11/23  3:57 PM   Result Value Ref Range    Amphetamine Screen, Urine Presumptive Positive (A) Presumptive Negative    Barbiturate Screen, Urine Presumptive Negative Presumptive Negative    Benzodiazepines Screen, Urine Presumptive Negative Presumptive Negative    Cannabinoid Screen, Urine Presumptive Negative Presumptive Negative    Cocaine Metabolite Screen, Urine Presumptive Negative Presumptive Negative    Fentanyl Screen, Urine Presumptive Negative Presumptive Negative    Opiate Screen, Urine Presumptive Negative Presumptive Negative    Oxycodone Screen, Urine Presumptive Negative Presumptive Negative    PCP Screen, Urine Presumptive  Negative Presumptive Negative     Results are as expected.         Controlled Substance Agreement:  Date of the Last Agreement: 10/11/2023  Reviewed Controlled Substance Agreement including but not limited to the benefits, risks, and alternatives to treatment with a Controlled Substance medication(s).    Stimulants:   What is the patient's goal of therapy? Improved focus and concentration  Is this being achieved with current treatment? yes    Activities of Daily Living:   Is your overall impression that this patient is benefiting (symptom reduction outweighs side effects) from stimulant therapy? Yes     1. Physical Functioning: Better  2. Family Relationship: Better  3. Social Relationship: better  4. Mood: Better  5. Sleep Patterns: Better  6. Overall Function: Better      REVIEW OF SYSTEMS  Review of Systems   Constitutional:  Negative for diaphoresis, fatigue and fever.   Respiratory:  Negative for shortness of breath.    Cardiovascular:  Negative for chest pain and palpitations.   Gastrointestinal:  Negative for abdominal pain, nausea and vomiting.        Occasional heartburn   Musculoskeletal:  Negative for back pain and neck pain.   Neurological:  Negative for dizziness, syncope, weakness, light-headedness and headaches.   Psychiatric/Behavioral:  Negative for confusion.        ALLERGIES  Levofloxacin, Sulfa (sulfonamide antibiotics), and Sulfamethoxazole-trimethoprim    MEDICATIONS  Current Outpatient Medications   Medication Instructions    albuterol 90 mcg/actuation inhaler 1-2 puffs, inhalation, Every 4 hours PRN    [START ON 8/3/2024] amphetamine-dextroamphetamine XR (Adderall XR) 25 mg 24 hr capsule 25 mg, oral, Every morning    L. acidophilus/Bifid. animalis 32 billion cell capsule oral    metoprolol succinate XL (TOPROL-XL) 25 mg, oral, Daily    multivitamin-Ca-iron-minerals (Tab-A-Alice Womens) 27-0.4 mg tablet 1 tablet, oral, Daily    pantoprazole (PROTONIX) 40 mg, oral, Daily    rizatriptan (MAXALT)  "10 mg, oral, Once as needed, TAKE 1 TABLET AT ONSET OF HEADACHE. MAY REPEAT EVERY 2 HOURS AS NEEDED. MAX 3 TABS IN 24 HOURS.    semaglutide (OZEMPIC) 1 mg, subcutaneous, Once Weekly    Wegovy 0.5 mg, subcutaneous, Once Weekly       TOBACCO USE  Social History     Tobacco Use   Smoking Status Every Day    Types: Cigarettes   Smokeless Tobacco Never       DEPRESSION SCREEN  Over the past 2 weeks, how often have you been bothered by any of the following problems?  Little interest or pleasure in doing things: Not at all  Feeling down, depressed, or hopeless: Not at all    SURGICAL HISTORY  Past Surgical History:  02/26/2014: MOUTH SURGERY      Comment:  Oral Surgery Tooth Extraction  02/29/2020: OTHER SURGICAL HISTORY      Comment:  Esophagogastroduodenoscopy  01/20/2021: OTHER SURGICAL HISTORY      Comment:  Endometrial ablation       OBJECTIVE    /77   Pulse 79   Temp 36.2 °C (97.2 °F)   Ht 1.6 m (5' 3\")   Wt 73.2 kg (161 lb 5 oz)   SpO2 98%   BMI 28.57 kg/m²    BMI: Estimated body mass index is 28.57 kg/m² as calculated from the following:    Height as of this encounter: 1.6 m (5' 3\").    Weight as of this encounter: 73.2 kg (161 lb 5 oz).    BP Readings from Last 3 Encounters:   07/22/24 115/77   04/22/24 92/69   01/15/24 130/85      Wt Readings from Last 3 Encounters:   07/22/24 73.2 kg (161 lb 5 oz)   04/22/24 81.3 kg (179 lb 3.2 oz)   01/15/24 78.4 kg (172 lb 14.4 oz)        PHYSICAL EXAM  Physical Exam  Constitutional:       Appearance: Normal appearance.   HENT:      Head: Normocephalic and atraumatic.   Cardiovascular:      Rate and Rhythm: Normal rate and regular rhythm.      Heart sounds: No murmur heard.  Pulmonary:      Effort: Pulmonary effort is normal. No respiratory distress.      Breath sounds: Normal breath sounds. No wheezing.   Musculoskeletal:      Right lower leg: No edema.      Left lower leg: No edema.   Neurological:      General: No focal deficit present.      Mental Status: She " is alert and oriented to person, place, and time. Mental status is at baseline.   Psychiatric:         Mood and Affect: Mood normal.         Behavior: Behavior normal.         Thought Content: Thought content normal.         Judgment: Judgment normal.          ASSESSMENT AND PLAN  Assessment/Plan   Problem List Items Addressed This Visit       Attention deficit disorder (ADD) - Primary    Relevant Medications    amphetamine-dextroamphetamine XR (Adderall XR) 25 mg 24 hr capsule (Start on 8/3/2024)    Benign essential hypertension    Migraine    Relevant Medications    rizatriptan (Maxalt) 10 mg tablet    Class 1 obesity due to excess calories with serious comorbidity and body mass index (BMI) of 31.0 to 31.9 in adult    High risk medication use       Attention Deficit Disorder - has been on Adderall for several years with good results.  Controlled Substance Agreement completed 10/11/23.  OARRS reviewed.  Urine drug screen 10/11/2023.   Refill sent.     Obesity, BMI 31 with fatty liver, difficulty losing weight - TSH normal Jan 2024.  She is following a low calorie diet and exercises regularly.  Obesity runs in family.  GLP1 not covered by insurance.  She is getting semaglutide from compounding pharmacy and it is working well for her.      Hypertension - controlled on current medication.     Migraine headaches - refill sent for rizatriptan PRN.      Follow-up in 90 days.

## 2024-07-25 ENCOUNTER — TELEPHONE (OUTPATIENT)
Dept: PRIMARY CARE | Facility: CLINIC | Age: 39
End: 2024-07-25
Payer: COMMERCIAL

## 2024-07-25 DIAGNOSIS — N30.90 CYSTITIS: Primary | ICD-10-CM

## 2024-07-25 RX ORDER — NITROFURANTOIN 25; 75 MG/1; MG/1
100 CAPSULE ORAL 2 TIMES DAILY
Qty: 10 CAPSULE | Refills: 0 | Status: SHIPPED | OUTPATIENT
Start: 2024-07-25 | End: 2024-07-30

## 2024-07-25 NOTE — TELEPHONE ENCOUNTER
Pt is calling to say she thinks she has a UTI.   Started Tuesday.   No fever.  She has burning, slight odor, cloudy urine.  Also feels like she has to go to the bathroom every 5 minutes.   Starting to get a little bit of kidney pain.         [id number] [place], [place] - [address] AT [address] [address]  Phone: [phone] Fax: [phone]

## 2024-08-08 ENCOUNTER — TELEPHONE (OUTPATIENT)
Dept: PRIMARY CARE | Facility: CLINIC | Age: 39
End: 2024-08-08
Payer: COMMERCIAL

## 2024-08-08 DIAGNOSIS — F98.8 ATTENTION DEFICIT DISORDER, UNSPECIFIED HYPERACTIVITY PRESENCE: ICD-10-CM

## 2024-08-08 DIAGNOSIS — E66.09 CLASS 1 OBESITY DUE TO EXCESS CALORIES WITH SERIOUS COMORBIDITY AND BODY MASS INDEX (BMI) OF 31.0 TO 31.9 IN ADULT: Primary | ICD-10-CM

## 2024-08-08 RX ORDER — DEXTROAMPHETAMINE SACCHARATE, AMPHETAMINE ASPARTATE MONOHYDRATE, DEXTROAMPHETAMINE SULFATE AND AMPHETAMINE SULFATE 6.25; 6.25; 6.25; 6.25 MG/1; MG/1; MG/1; MG/1
25 CAPSULE, EXTENDED RELEASE ORAL EVERY MORNING
Qty: 30 CAPSULE | Refills: 0 | Status: SHIPPED | OUTPATIENT
Start: 2024-08-08

## 2024-08-08 NOTE — TELEPHONE ENCOUNTER
[facility] mg.  [facility].  She says the [facility]   in [place] has a 30 day supply.  Can you call it in there?     [id number] [address] [address]  Phone: [phone] Fax: [phone]

## 2024-08-14 RX ORDER — SEMAGLUTIDE 1.7 MG/.75ML
1.7 INJECTION, SOLUTION SUBCUTANEOUS
Qty: 3 ML | Refills: 0 | Status: SHIPPED | OUTPATIENT
Start: 2024-08-18 | End: 2024-09-09

## 2024-09-09 ENCOUNTER — TELEPHONE (OUTPATIENT)
Dept: PRIMARY CARE | Facility: CLINIC | Age: 39
End: 2024-09-09
Payer: COMMERCIAL

## 2024-09-09 DIAGNOSIS — F98.8 ATTENTION DEFICIT DISORDER, UNSPECIFIED HYPERACTIVITY PRESENCE: ICD-10-CM

## 2024-09-09 RX ORDER — DEXTROAMPHETAMINE SACCHARATE, AMPHETAMINE ASPARTATE MONOHYDRATE, DEXTROAMPHETAMINE SULFATE AND AMPHETAMINE SULFATE 6.25; 6.25; 6.25; 6.25 MG/1; MG/1; MG/1; MG/1
25 CAPSULE, EXTENDED RELEASE ORAL EVERY MORNING
Qty: 30 CAPSULE | Refills: 0 | Status: SHIPPED | OUTPATIENT
Start: 2024-09-09

## 2024-09-09 NOTE — TELEPHONE ENCOUNTER
Pt called for a refill on amphetamine-dextroamphetamine XR (Adderall XR) 25 mg 24 hr capsule       [facility] [address] [place] [address] - [address]  [address] [place] [id number]  Phone: [phone] Fax: [phone]

## 2024-10-10 ENCOUNTER — TELEPHONE (OUTPATIENT)
Dept: PRIMARY CARE | Facility: CLINIC | Age: 39
End: 2024-10-10
Payer: COMMERCIAL

## 2024-10-10 DIAGNOSIS — F98.8 ATTENTION DEFICIT DISORDER, UNSPECIFIED TYPE: Primary | ICD-10-CM

## 2024-10-10 RX ORDER — DEXTROAMPHETAMINE SACCHARATE, AMPHETAMINE ASPARTATE MONOHYDRATE, DEXTROAMPHETAMINE SULFATE AND AMPHETAMINE SULFATE 6.25; 6.25; 6.25; 6.25 MG/1; MG/1; MG/1; MG/1
25 CAPSULE, EXTENDED RELEASE ORAL EVERY MORNING
Qty: 30 CAPSULE | Refills: 0 | Status: SHIPPED | OUTPATIENT
Start: 2024-10-10

## 2024-10-10 NOTE — TELEPHONE ENCOUNTER
Pt calling to request refill.  Pt is out    amphetamine-dextroamphetamine XR (Adderall XR) 25 mg 24 hr capsule          [id number] [place], [place] - [address]  [address] [place] [id number]  Phone: [phone] Fax: [phone]

## 2024-10-21 ENCOUNTER — APPOINTMENT (OUTPATIENT)
Dept: PRIMARY CARE | Facility: CLINIC | Age: 39
End: 2024-10-21
Payer: COMMERCIAL

## 2024-10-21 VITALS
HEART RATE: 81 BPM | TEMPERATURE: 96.6 F | HEIGHT: 63 IN | BODY MASS INDEX: 25.76 KG/M2 | SYSTOLIC BLOOD PRESSURE: 126 MMHG | WEIGHT: 145.4 LBS | DIASTOLIC BLOOD PRESSURE: 79 MMHG

## 2024-10-21 DIAGNOSIS — F98.8 ATTENTION DEFICIT DISORDER, UNSPECIFIED TYPE: Primary | ICD-10-CM

## 2024-10-21 DIAGNOSIS — E66.09 CLASS 1 OBESITY DUE TO EXCESS CALORIES WITH SERIOUS COMORBIDITY AND BODY MASS INDEX (BMI) OF 31.0 TO 31.9 IN ADULT: ICD-10-CM

## 2024-10-21 DIAGNOSIS — E66.811 CLASS 1 OBESITY DUE TO EXCESS CALORIES WITH SERIOUS COMORBIDITY AND BODY MASS INDEX (BMI) OF 31.0 TO 31.9 IN ADULT: ICD-10-CM

## 2024-10-21 DIAGNOSIS — Z79.899 HIGH RISK MEDICATION USE: ICD-10-CM

## 2024-10-21 DIAGNOSIS — I10 BENIGN ESSENTIAL HYPERTENSION: ICD-10-CM

## 2024-10-21 PROCEDURE — 99214 OFFICE O/P EST MOD 30 MIN: CPT | Performed by: INTERNAL MEDICINE

## 2024-10-21 PROCEDURE — 3008F BODY MASS INDEX DOCD: CPT | Performed by: INTERNAL MEDICINE

## 2024-10-21 PROCEDURE — 3074F SYST BP LT 130 MM HG: CPT | Performed by: INTERNAL MEDICINE

## 2024-10-21 PROCEDURE — 3078F DIAST BP <80 MM HG: CPT | Performed by: INTERNAL MEDICINE

## 2024-10-21 RX ORDER — SEMAGLUTIDE 1 MG/.5ML
1 INJECTION, SOLUTION SUBCUTANEOUS
COMMUNITY

## 2024-10-21 RX ORDER — DEXTROAMPHETAMINE SACCHARATE, AMPHETAMINE ASPARTATE MONOHYDRATE, DEXTROAMPHETAMINE SULFATE AND AMPHETAMINE SULFATE 6.25; 6.25; 6.25; 6.25 MG/1; MG/1; MG/1; MG/1
25 CAPSULE, EXTENDED RELEASE ORAL EVERY MORNING
Qty: 90 CAPSULE | Refills: 0 | Status: SHIPPED | OUTPATIENT
Start: 2024-11-09

## 2024-10-21 ASSESSMENT — ENCOUNTER SYMPTOMS
LIGHT-HEADEDNESS: 0
ARTHRALGIAS: 0
SORE THROAT: 0
PALPITATIONS: 0
ABDOMINAL PAIN: 0
NAUSEA: 1
BACK PAIN: 0
CHILLS: 0
FATIGUE: 0
HEADACHES: 0
DYSPHORIC MOOD: 0
SHORTNESS OF BREATH: 0
CONSTIPATION: 0
HEMATURIA: 0
DIARRHEA: 0
COUGH: 0
CONFUSION: 0
DIZZINESS: 0
NERVOUS/ANXIOUS: 0
DYSURIA: 0
FEVER: 0
VOMITING: 0

## 2024-10-21 ASSESSMENT — PATIENT HEALTH QUESTIONNAIRE - PHQ9
1. LITTLE INTEREST OR PLEASURE IN DOING THINGS: NOT AT ALL
SUM OF ALL RESPONSES TO PHQ9 QUESTIONS 1 AND 2: 0
2. FEELING DOWN, DEPRESSED OR HOPELESS: NOT AT ALL

## 2024-10-21 NOTE — PROGRESS NOTES
CHIEF COMPLAINT  ADD    HISTORY OF PRESENT ILLNESS  Trisha Taylor is a 39 y.o. female presents today for follow up of ADD    HPI    Losing weight with Wegovy.  Current dose of 1.7 mg is causing a lot of nausea.  She has 1 shot remaining and then she'd like to change to 1 mg again.  Does not need refill yet.    Doing well on Adderall - prefers 90 day supply to [facility].     OARRS:  Allyson Valle MD on 10/21/2024  1:01 PM  I have personally reviewed the OARRS report for Trisha Taylor. I have considered the risks of abuse, dependence, addiction and diversion and I believe that it is clinically appropriate for Trisha Taylor to be prescribed this medication    Is the patient prescribed a combination of a benzodiazepine and opioid?  No    Last Urine Drug Screen / ordered today: Yes  No results found for this or any previous visit (from the past 8760 hours).  N/A      Controlled Substance Agreement:  Date of the Last Agreement: 10/21/24  Reviewed Controlled Substance Agreement including but not limited to the benefits, risks, and alternatives to treatment with a Controlled Substance medication(s).    Stimulants:   What is the patient's goal of therapy? Improved focus and concentration  Is this being achieved with current treatment? yes    Activities of Daily Living:   Is your overall impression that this patient is benefiting (symptom reduction outweighs side effects) from stimulant therapy? Yes     1. Physical Functioning: Better  2. Family Relationship: Same  3. Social Relationship: Same  4. Mood: Better  5. Sleep Patterns: Same  6. Overall Function: Better      REVIEW OF SYSTEMS  Review of Systems   Constitutional:  Negative for chills, fatigue and fever.   HENT:  Negative for sore throat.    Respiratory:  Negative for cough and shortness of breath.    Cardiovascular:  Negative for chest pain, palpitations and leg swelling.   Gastrointestinal:  Positive for nausea. Negative for abdominal pain, constipation, diarrhea  "and vomiting.   Genitourinary:  Negative for dysuria and hematuria.   Musculoskeletal:  Negative for arthralgias, back pain and gait problem.   Skin:  Negative for rash.   Neurological:  Negative for dizziness, light-headedness and headaches.   Psychiatric/Behavioral:  Negative for confusion and dysphoric mood. The patient is not nervous/anxious.        ALLERGIES  Levofloxacin, Sulfa (sulfonamide antibiotics), and Sulfamethoxazole-trimethoprim    MEDICATIONS  Current Outpatient Medications   Medication Instructions    albuterol 90 mcg/actuation inhaler 1-2 puffs, inhalation, Every 4 hours PRN    [START ON 11/9/2024] amphetamine-dextroamphetamine XR (Adderall XR) 25 mg 24 hr capsule 25 mg, oral, Every morning    L. acidophilus/Bifid. animalis 32 billion cell capsule Take by mouth.    metoprolol succinate XL (TOPROL-XL) 25 mg, oral, Daily    multivitamin-Ca-iron-minerals (Tab-A-Alice Womens) 27-0.4 mg tablet 1 tablet, Daily    pantoprazole (PROTONIX) 40 mg, oral, Daily    rizatriptan (MAXALT) 10 mg, oral, Once as needed, TAKE 1 TABLET AT ONSET OF HEADACHE. MAY REPEAT EVERY 2 HOURS AS NEEDED. MAX 3 TABS IN 24 HOURS.    Wegovy 1 mg, subcutaneous, Every 7 days       TOBACCO USE  Social History     Tobacco Use   Smoking Status Every Day    Types: Cigarettes   Smokeless Tobacco Never       DEPRESSION SCREEN  Over the past 2 weeks, how often have you been bothered by any of the following problems?  Little interest or pleasure in doing things: Not at all  Feeling down, depressed, or hopeless: Not at all    SURGICAL HISTORY  Past Surgical History:  02/26/2014: MOUTH SURGERY      Comment:  Oral Surgery Tooth Extraction  02/29/2020: OTHER SURGICAL HISTORY      Comment:  Esophagogastroduodenoscopy  01/20/2021: OTHER SURGICAL HISTORY      Comment:  Endometrial ablation       OBJECTIVE    /79   Pulse 81   Temp 35.9 °C (96.6 °F)   Ht 1.6 m (5' 3\")   Wt 66 kg (145 lb 6.4 oz)   BMI 25.76 kg/m²    BMI: Estimated body mass " "index is 25.76 kg/m² as calculated from the following:    Height as of this encounter: 1.6 m (5' 3\").    Weight as of this encounter: 66 kg (145 lb 6.4 oz).    BP Readings from Last 3 Encounters:   10/21/24 126/79   07/22/24 115/77   04/22/24 92/69      Wt Readings from Last 3 Encounters:   10/21/24 66 kg (145 lb 6.4 oz)   07/22/24 73.2 kg (161 lb 5 oz)   04/22/24 81.3 kg (179 lb 3.2 oz)        PHYSICAL EXAM  Physical Exam  Constitutional:       Appearance: Normal appearance.   HENT:      Head: Normocephalic and atraumatic.   Cardiovascular:      Rate and Rhythm: Normal rate and regular rhythm.      Heart sounds: No murmur heard.  Pulmonary:      Effort: Pulmonary effort is normal. No respiratory distress.      Breath sounds: Normal breath sounds. No wheezing.   Abdominal:      General: There is no distension.      Palpations: Abdomen is soft.      Tenderness: There is no abdominal tenderness.   Musculoskeletal:      Right lower leg: No edema.      Left lower leg: No edema.   Neurological:      General: No focal deficit present.      Mental Status: She is alert and oriented to person, place, and time. Mental status is at baseline.      Gait: Gait normal.   Psychiatric:         Mood and Affect: Mood normal.         Behavior: Behavior normal.         Thought Content: Thought content normal.         Judgment: Judgment normal.          ASSESSMENT AND PLAN  Assessment/Plan   Problem List Items Addressed This Visit       Attention deficit disorder (ADD) - Primary    Relevant Medications    amphetamine-dextroamphetamine XR (Adderall XR) 25 mg 24 hr capsule (Start on 11/9/2024)    Other Relevant Orders    Drug Screen, Urine With Reflex to Confirmation    Benign essential hypertension    Class 1 obesity due to excess calories with serious comorbidity and body mass index (BMI) of 31.0 to 31.9 in adult    High risk medication use    Relevant Orders    Drug Screen, Urine With Reflex to Confirmation       Attention Deficit " Disorder - has been on Adderall for several years with good results.  Controlled Substance Agreement completed 10/21/24.  OARRS reviewed.  Urine drug screen 10/21/24.   Refill sent.     Obesity, BMI 31 with fatty liver, difficulty losing weight - TSH normal Jan 2024.  She is following a low calorie diet and exercises regularly.  Obesity runs in family.  GLP1 not covered by insurance.  She is getting semaglutide from compounding pharmacy and it is working well for her.  Has nausea on 1.7 mg weekly, will reduce dose to 1 mg weekly when she is due for her next Rx.      Hypertension - controlled on current medication.      Follow-up in 90 days.

## 2024-10-22 LAB
AMPHETAMINES UR QL SCN: ABNORMAL
BARBITURATES UR QL SCN: ABNORMAL
BENZODIAZ UR QL SCN: ABNORMAL
BZE UR QL SCN: ABNORMAL
CANNABINOIDS UR QL SCN: ABNORMAL
FENTANYL+NORFENTANYL UR QL SCN: ABNORMAL
METHADONE UR QL SCN: ABNORMAL
OPIATES UR QL SCN: ABNORMAL
OXYCODONE+OXYMORPHONE UR QL SCN: ABNORMAL
PCP UR QL SCN: ABNORMAL

## 2024-11-04 ENCOUNTER — TELEPHONE (OUTPATIENT)
Dept: PRIMARY CARE | Facility: CLINIC | Age: 39
End: 2024-11-04
Payer: COMMERCIAL

## 2024-11-04 DIAGNOSIS — J02.0 STREP THROAT: Primary | ICD-10-CM

## 2024-11-04 RX ORDER — CLINDAMYCIN HYDROCHLORIDE 300 MG/1
300 CAPSULE ORAL 2 TIMES DAILY
Qty: 20 CAPSULE | Refills: 0 | Status: SHIPPED | OUTPATIENT
Start: 2024-11-04 | End: 2024-11-14

## 2024-11-06 NOTE — TELEPHONE ENCOUNTER
Patient called 11/4 PM with sore throat, nausea.  Two children currently on treatment for confirmed Strep and she has same symptoms.  States she gets side effects from amoxicillin.  Rx sent for clindamycin.  Continue supportive care.

## 2024-11-11 ENCOUNTER — TELEPHONE (OUTPATIENT)
Dept: RHEUMATOLOGY | Facility: CLINIC | Age: 39
End: 2024-11-11
Payer: COMMERCIAL

## 2024-11-11 DIAGNOSIS — F98.8 ATTENTION DEFICIT DISORDER, UNSPECIFIED TYPE: ICD-10-CM

## 2024-11-11 RX ORDER — DEXTROAMPHETAMINE SACCHARATE, AMPHETAMINE ASPARTATE MONOHYDRATE, DEXTROAMPHETAMINE SULFATE AND AMPHETAMINE SULFATE 6.25; 6.25; 6.25; 6.25 MG/1; MG/1; MG/1; MG/1
25 CAPSULE, EXTENDED RELEASE ORAL EVERY MORNING
Qty: 90 CAPSULE | Refills: 0 | Status: SHIPPED | OUTPATIENT
Start: 2024-11-11

## 2024-11-11 NOTE — TELEPHONE ENCOUNTER
Patient called requesting a 90 day refill on amphetamine-dextroamphetamine XR (Adderall XR) 25 mg 24 hr capsule [facility] [place]     R hand pain/yes

## 2024-12-19 DIAGNOSIS — E66.811 CLASS 1 OBESITY DUE TO EXCESS CALORIES WITH SERIOUS COMORBIDITY AND BODY MASS INDEX (BMI) OF 31.0 TO 31.9 IN ADULT: Primary | ICD-10-CM

## 2024-12-19 DIAGNOSIS — E66.09 CLASS 1 OBESITY DUE TO EXCESS CALORIES WITH SERIOUS COMORBIDITY AND BODY MASS INDEX (BMI) OF 31.0 TO 31.9 IN ADULT: Primary | ICD-10-CM

## 2024-12-19 RX ORDER — SEMAGLUTIDE 1.7 MG/.75ML
1.7 INJECTION, SOLUTION SUBCUTANEOUS WEEKLY
Qty: 3 ML | Refills: 0 | Status: SHIPPED | OUTPATIENT
Start: 2024-12-19 | End: 2025-01-10

## 2025-01-20 ENCOUNTER — APPOINTMENT (OUTPATIENT)
Dept: PRIMARY CARE | Facility: CLINIC | Age: 40
End: 2025-01-20
Payer: COMMERCIAL

## 2025-01-20 VITALS
HEART RATE: 71 BPM | WEIGHT: 142.3 LBS | HEIGHT: 63 IN | DIASTOLIC BLOOD PRESSURE: 84 MMHG | BODY MASS INDEX: 25.21 KG/M2 | SYSTOLIC BLOOD PRESSURE: 116 MMHG | TEMPERATURE: 97 F

## 2025-01-20 DIAGNOSIS — N30.90 CYSTITIS: ICD-10-CM

## 2025-01-20 DIAGNOSIS — F98.8 ATTENTION DEFICIT DISORDER, UNSPECIFIED TYPE: ICD-10-CM

## 2025-01-20 DIAGNOSIS — Z79.899 HIGH RISK MEDICATION USE: ICD-10-CM

## 2025-01-20 DIAGNOSIS — I10 BENIGN ESSENTIAL HYPERTENSION: ICD-10-CM

## 2025-01-20 DIAGNOSIS — E66.09 CLASS 1 OBESITY DUE TO EXCESS CALORIES WITH SERIOUS COMORBIDITY AND BODY MASS INDEX (BMI) OF 31.0 TO 31.9 IN ADULT: ICD-10-CM

## 2025-01-20 DIAGNOSIS — J45.990 EXERCISE-INDUCED ASTHMA (HHS-HCC): ICD-10-CM

## 2025-01-20 DIAGNOSIS — E66.811 CLASS 1 OBESITY DUE TO EXCESS CALORIES WITH SERIOUS COMORBIDITY AND BODY MASS INDEX (BMI) OF 31.0 TO 31.9 IN ADULT: ICD-10-CM

## 2025-01-20 DIAGNOSIS — R30.0 DYSURIA: Primary | ICD-10-CM

## 2025-01-20 LAB
POC APPEARANCE, URINE: ABNORMAL
POC BILIRUBIN, URINE: NEGATIVE
POC BLOOD, URINE: ABNORMAL
POC COLOR, URINE: YELLOW
POC GLUCOSE, URINE: NEGATIVE MG/DL
POC KETONES, URINE: NEGATIVE MG/DL
POC LEUKOCYTES, URINE: ABNORMAL
POC NITRITE,URINE: POSITIVE
POC PH, URINE: 5.5 PH
POC PROTEIN, URINE: ABNORMAL MG/DL
POC SPECIFIC GRAVITY, URINE: >=1.03
POC UROBILINOGEN, URINE: 0.2 EU/DL

## 2025-01-20 PROCEDURE — 81003 URINALYSIS AUTO W/O SCOPE: CPT | Performed by: INTERNAL MEDICINE

## 2025-01-20 PROCEDURE — 99214 OFFICE O/P EST MOD 30 MIN: CPT | Performed by: INTERNAL MEDICINE

## 2025-01-20 PROCEDURE — 3008F BODY MASS INDEX DOCD: CPT | Performed by: INTERNAL MEDICINE

## 2025-01-20 PROCEDURE — 3079F DIAST BP 80-89 MM HG: CPT | Performed by: INTERNAL MEDICINE

## 2025-01-20 PROCEDURE — 3074F SYST BP LT 130 MM HG: CPT | Performed by: INTERNAL MEDICINE

## 2025-01-20 RX ORDER — DEXTROAMPHETAMINE SACCHARATE, AMPHETAMINE ASPARTATE MONOHYDRATE, DEXTROAMPHETAMINE SULFATE AND AMPHETAMINE SULFATE 6.25; 6.25; 6.25; 6.25 MG/1; MG/1; MG/1; MG/1
25 CAPSULE, EXTENDED RELEASE ORAL EVERY MORNING
Qty: 90 CAPSULE | Refills: 0 | Status: SHIPPED | OUTPATIENT
Start: 2025-01-20

## 2025-01-20 RX ORDER — NITROFURANTOIN 25; 75 MG/1; MG/1
100 CAPSULE ORAL 2 TIMES DAILY
Qty: 14 CAPSULE | Refills: 0 | Status: SHIPPED | OUTPATIENT
Start: 2025-01-20 | End: 2025-01-27

## 2025-01-20 ASSESSMENT — ENCOUNTER SYMPTOMS
PALPITATIONS: 0
FATIGUE: 0
COUGH: 0
HEMATURIA: 0
CHILLS: 0
BACK PAIN: 0
HEADACHES: 0
DYSPHORIC MOOD: 0
DIARRHEA: 0
VOMITING: 0
SHORTNESS OF BREATH: 0
ARTHRALGIAS: 0
DIZZINESS: 0
FEVER: 0
FREQUENCY: 1
DYSURIA: 1
CONFUSION: 0
CONSTIPATION: 0
NERVOUS/ANXIOUS: 0
LIGHT-HEADEDNESS: 0
ABDOMINAL PAIN: 0

## 2025-01-20 ASSESSMENT — PATIENT HEALTH QUESTIONNAIRE - PHQ9
1. LITTLE INTEREST OR PLEASURE IN DOING THINGS: NOT AT ALL
2. FEELING DOWN, DEPRESSED OR HOPELESS: NOT AT ALL
SUM OF ALL RESPONSES TO PHQ9 QUESTIONS 1 AND 2: 0

## 2025-01-20 NOTE — PROGRESS NOTES
CHIEF COMPLAINT  ADD    HISTORY OF PRESENT ILLNESS  Trisha Taylor is a 40 y.o. female presents today for follow up of ADD    HPI    Burning with urination- started ~10 days ago  Cloudy urine  Had kidney stone last month - told it was 5 mm  It was felt to have passed in to her bladder while she was in ER.  Has not yet followed-up with urology.   No fever, no gross hematuria.     Doing well on semaglutide, gets from compounding pharmacy, supposed to be on 1.7 mg weekly at present.     Last refill 11/1/24, Adderall #90    OARRS:  Allyson Valle MD on 1/20/2025  8:44 AM  I have personally reviewed the OARRS report for Trisha Taylor. I have considered the risks of abuse, dependence, addiction and diversion and I believe that it is clinically appropriate for Trisha Taylor to be prescribed this medication    Is the patient prescribed a combination of a benzodiazepine and opioid?  No    Last Urine Drug Screen / ordered today: No  Recent Results (from the past 8760 hours)   Amphetamine Confirm, Urine    Collection Time: 10/21/24 11:25 AM   Result Value Ref Range    Methamphetamine Quant, Ur <200 ng/mL    MDA, Urine <200 ng/mL    MDEA, Urine <200 ng/mL    Phentermine,Urine <200 ng/mL    Amphetamines,Urine >5000 ng/mL    MDMA, Urine <200 ng/mL   Drug Screen, Urine With Reflex to Confirmation    Collection Time: 10/21/24 11:25 AM   Result Value Ref Range    Amphetamine Screen, Urine Presumptive Positive (A) Presumptive Negative    Barbiturate Screen, Urine Presumptive Negative Presumptive Negative    Benzodiazepines Screen, Urine Presumptive Negative Presumptive Negative    Cannabinoid Screen, Urine Presumptive Negative Presumptive Negative    Cocaine Metabolite Screen, Urine Presumptive Negative Presumptive Negative    Fentanyl Screen, Urine Presumptive Negative Presumptive Negative    Opiate Screen, Urine Presumptive Negative Presumptive Negative    Oxycodone Screen, Urine Presumptive Negative Presumptive Negative    PCP  Screen, Urine Presumptive Negative Presumptive Negative    Methadone Screen, Urine Presumptive Negative Presumptive Negative     Results are as expected.         Controlled Substance Agreement:  Date of the Last Agreement: 10/21/24  Reviewed Controlled Substance Agreement including but not limited to the benefits, risks, and alternatives to treatment with a Controlled Substance medication(s).    Stimulants:   What is the patient's goal of therapy? Improved focus and concentration  Is this being achieved with current treatment? yes    Activities of Daily Living:   Is your overall impression that this patient is benefiting (symptom reduction outweighs side effects) from stimulant therapy? Yes     1. Physical Functioning: Better  2. Family Relationship: Better  3. Social Relationship: Better  4. Mood: Better  5. Sleep Patterns: Better  6. Overall Function: Better      REVIEW OF SYSTEMS  Review of Systems   Constitutional:  Negative for chills, fatigue and fever.   Respiratory:  Negative for cough and shortness of breath.    Cardiovascular:  Negative for chest pain, palpitations and leg swelling.   Gastrointestinal:  Negative for abdominal pain, constipation, diarrhea and vomiting.   Genitourinary:  Positive for dysuria and frequency. Negative for hematuria.   Musculoskeletal:  Negative for arthralgias, back pain and gait problem.   Neurological:  Negative for dizziness, light-headedness and headaches.   Psychiatric/Behavioral:  Negative for confusion and dysphoric mood. The patient is not nervous/anxious.        ALLERGIES  Amoxicillin-pot clavulanate, Levofloxacin, Sulfa (sulfonamide antibiotics), and Sulfamethoxazole-trimethoprim    MEDICATIONS  Current Outpatient Medications   Medication Instructions    albuterol 90 mcg/actuation inhaler 1-2 puffs, inhalation, Every 4 hours PRN    amphetamine-dextroamphetamine XR (Adderall XR) 25 mg 24 hr capsule 25 mg, oral, Every morning    L. acidophilus/Bifid. animalis 32 billion  "cell capsule Take by mouth.    metoprolol succinate XL (TOPROL-XL) 25 mg, oral, Daily    multivitamin-Ca-iron-minerals (Tab-A-Alice Womens) 27-0.4 mg tablet 1 tablet, Daily    nitrofurantoin, macrocrystal-monohydrate, (Macrobid) 100 mg capsule 100 mg, oral, 2 times daily    pantoprazole (PROTONIX) 40 mg, oral, Daily    rizatriptan (MAXALT) 10 mg, oral, Once as needed, TAKE 1 TABLET AT ONSET OF HEADACHE. MAY REPEAT EVERY 2 HOURS AS NEEDED. MAX 3 TABS IN 24 HOURS.    Wegovy 1.7 mg, subcutaneous, Weekly       TOBACCO USE  Social History     Tobacco Use   Smoking Status Every Day    Types: Cigarettes   Smokeless Tobacco Never       DEPRESSION SCREEN  Over the past 2 weeks, how often have you been bothered by any of the following problems?  Little interest or pleasure in doing things: Not at all  Feeling down, depressed, or hopeless: Not at all    SURGICAL HISTORY  Past Surgical History:  02/26/2014: MOUTH SURGERY      Comment:  Oral Surgery Tooth Extraction  02/29/2020: OTHER SURGICAL HISTORY      Comment:  Esophagogastroduodenoscopy  01/20/2021: OTHER SURGICAL HISTORY      Comment:  Endometrial ablation       OBJECTIVE    /84   Pulse 71   Temp 36.1 °C (97 °F)   Ht 1.6 m (5' 3\")   Wt 64.5 kg (142 lb 4.8 oz)   BMI 25.21 kg/m²    BMI: Estimated body mass index is 25.21 kg/m² as calculated from the following:    Height as of this encounter: 1.6 m (5' 3\").    Weight as of this encounter: 64.5 kg (142 lb 4.8 oz).    BP Readings from Last 3 Encounters:   01/20/25 116/84   10/21/24 126/79   07/22/24 115/77      Wt Readings from Last 3 Encounters:   01/20/25 64.5 kg (142 lb 4.8 oz)   10/21/24 66 kg (145 lb 6.4 oz)   07/22/24 73.2 kg (161 lb 5 oz)        PHYSICAL EXAM  Physical Exam  Constitutional:       Appearance: Normal appearance.   HENT:      Head: Normocephalic and atraumatic.   Cardiovascular:      Rate and Rhythm: Normal rate and regular rhythm.      Heart sounds: No murmur heard.  Pulmonary:      Effort: " Pulmonary effort is normal. No respiratory distress.      Breath sounds: Normal breath sounds. No wheezing.   Abdominal:      General: There is no distension.      Palpations: Abdomen is soft.      Tenderness: There is no abdominal tenderness.   Musculoskeletal:      Right lower leg: No edema.      Left lower leg: No edema.   Neurological:      General: No focal deficit present.      Mental Status: She is alert and oriented to person, place, and time. Mental status is at baseline.      Gait: Gait normal.   Psychiatric:         Mood and Affect: Mood normal.         Behavior: Behavior normal.         Thought Content: Thought content normal.         Judgment: Judgment normal.          ASSESSMENT AND PLAN  Assessment/Plan   Problem List Items Addressed This Visit       Attention deficit disorder (ADD)    Relevant Medications    amphetamine-dextroamphetamine XR (Adderall XR) 25 mg 24 hr capsule    Benign essential hypertension    Exercise-induced asthma (HHS-HCC)    Overview     Uses albuterol PRN         Class 1 obesity due to excess calories with serious comorbidity and body mass index (BMI) of 31.0 to 31.9 in adult    High risk medication use    Cystitis     Other Visit Diagnoses       Dysuria    -  Primary    Relevant Medications    nitrofurantoin, macrocrystal-monohydrate, (Macrobid) 100 mg capsule    Other Relevant Orders    POCT UA Automated manually resulted (Completed)          Attention Deficit Disorder - has been on Adderall for several years with good results.  Controlled Substance Agreement completed 10/21/24.  OARRS reviewed.  Urine drug screen 10/21/24.   Refill sent.     Obesity, BMI 31 with fatty liver, difficulty losing weight - TSH normal Jan 2024.  She is following a low calorie diet and exercises regularly.  Obesity runs in family.  GLP1 not covered by insurance.  She is getting semaglutide from compounding pharmacy and it is working well for her.       Hypertension - controlled on current  medication.     Cystitis - UA concerning for UTI.  Rx sent for nitrofurantoin.   I encouraged her to follow-up with urology regarding kidney stone.      Follow-up in 90 days.

## 2025-03-04 LAB — NON-UH HIE GP HPV: ABNORMAL

## 2025-03-06 LAB
NON-UH HIE GENOTYPE 16: NEGATIVE
NON-UH HIE GENOTYPE 18/45: NEGATIVE
NON-UH HIE THINPREP TIS PAP: ABNORMAL

## 2025-04-18 DIAGNOSIS — E66.09 CLASS 1 OBESITY DUE TO EXCESS CALORIES WITH SERIOUS COMORBIDITY AND BODY MASS INDEX (BMI) OF 31.0 TO 31.9 IN ADULT: Primary | ICD-10-CM

## 2025-04-18 DIAGNOSIS — E66.811 CLASS 1 OBESITY DUE TO EXCESS CALORIES WITH SERIOUS COMORBIDITY AND BODY MASS INDEX (BMI) OF 31.0 TO 31.9 IN ADULT: Primary | ICD-10-CM

## 2025-04-18 RX ORDER — SEMAGLUTIDE 0.5 MG/0.1
1.5 SYRINGE (ML) SUBCUTANEOUS WEEKLY
Qty: 1.2 ML | Refills: 5 | Status: SHIPPED | OUTPATIENT
Start: 2025-04-18 | End: 2025-05-18

## 2025-04-22 ENCOUNTER — APPOINTMENT (OUTPATIENT)
Dept: PRIMARY CARE | Facility: CLINIC | Age: 40
End: 2025-04-22
Payer: COMMERCIAL

## 2025-05-01 ENCOUNTER — APPOINTMENT (OUTPATIENT)
Dept: PRIMARY CARE | Facility: CLINIC | Age: 40
End: 2025-05-01
Payer: COMMERCIAL

## 2025-05-01 VITALS
SYSTOLIC BLOOD PRESSURE: 113 MMHG | TEMPERATURE: 97.1 F | DIASTOLIC BLOOD PRESSURE: 80 MMHG | BODY MASS INDEX: 25.16 KG/M2 | WEIGHT: 142 LBS | HEART RATE: 91 BPM | HEIGHT: 63 IN

## 2025-05-01 DIAGNOSIS — Z79.899 HIGH RISK MEDICATION USE: ICD-10-CM

## 2025-05-01 DIAGNOSIS — R11.0 NAUSEA: ICD-10-CM

## 2025-05-01 DIAGNOSIS — F98.8 ATTENTION DEFICIT DISORDER, UNSPECIFIED TYPE: Primary | Chronic | ICD-10-CM

## 2025-05-01 DIAGNOSIS — E66.09 CLASS 1 OBESITY DUE TO EXCESS CALORIES WITH SERIOUS COMORBIDITY AND BODY MASS INDEX (BMI) OF 31.0 TO 31.9 IN ADULT: ICD-10-CM

## 2025-05-01 DIAGNOSIS — E66.811 CLASS 1 OBESITY DUE TO EXCESS CALORIES WITH SERIOUS COMORBIDITY AND BODY MASS INDEX (BMI) OF 31.0 TO 31.9 IN ADULT: ICD-10-CM

## 2025-05-01 DIAGNOSIS — I10 BENIGN ESSENTIAL HYPERTENSION: Chronic | ICD-10-CM

## 2025-05-01 PROCEDURE — 3079F DIAST BP 80-89 MM HG: CPT | Performed by: INTERNAL MEDICINE

## 2025-05-01 PROCEDURE — 3074F SYST BP LT 130 MM HG: CPT | Performed by: INTERNAL MEDICINE

## 2025-05-01 PROCEDURE — 99214 OFFICE O/P EST MOD 30 MIN: CPT | Performed by: INTERNAL MEDICINE

## 2025-05-01 PROCEDURE — 3008F BODY MASS INDEX DOCD: CPT | Performed by: INTERNAL MEDICINE

## 2025-05-01 RX ORDER — DEXTROAMPHETAMINE SACCHARATE, AMPHETAMINE ASPARTATE MONOHYDRATE, DEXTROAMPHETAMINE SULFATE AND AMPHETAMINE SULFATE 6.25; 6.25; 6.25; 6.25 MG/1; MG/1; MG/1; MG/1
25 CAPSULE, EXTENDED RELEASE ORAL EVERY MORNING
Qty: 90 CAPSULE | Refills: 0 | Status: SHIPPED | OUTPATIENT
Start: 2025-05-01

## 2025-05-01 RX ORDER — ONDANSETRON 4 MG/1
4 TABLET, ORALLY DISINTEGRATING ORAL EVERY 8 HOURS PRN
Qty: 20 TABLET | Refills: 0 | Status: SHIPPED | OUTPATIENT
Start: 2025-05-01 | End: 2025-05-08

## 2025-05-01 ASSESSMENT — ENCOUNTER SYMPTOMS
BACK PAIN: 0
NERVOUS/ANXIOUS: 0
HEADACHES: 0
PALPITATIONS: 0
FATIGUE: 0
SHORTNESS OF BREATH: 0
DIARRHEA: 0
FEVER: 0
LIGHT-HEADEDNESS: 0
VOMITING: 0
ABDOMINAL PAIN: 0
COUGH: 0
ARTHRALGIAS: 0
NAUSEA: 1
RHINORRHEA: 1
DYSPHORIC MOOD: 0
CONFUSION: 0
DIZZINESS: 0
CONSTIPATION: 0
CHILLS: 0

## 2025-05-01 ASSESSMENT — PATIENT HEALTH QUESTIONNAIRE - PHQ9
2. FEELING DOWN, DEPRESSED OR HOPELESS: NOT AT ALL
1. LITTLE INTEREST OR PLEASURE IN DOING THINGS: NOT AT ALL
SUM OF ALL RESPONSES TO PHQ9 QUESTIONS 1 AND 2: 0

## 2025-05-01 NOTE — PROGRESS NOTES
CHIEF COMPLAINT  ADD    HISTORY OF PRESENT ILLNESS  Trisha Taylor is a 40 y.o. female presents today for follow up of ADD    HPI    Health is stable since last visit.  Needs refill on Adderall.  Doing well on Ozempic.  Higher dose causes some nausea, she is requesting Zofran.  Her personal goal weight is 125-135 lbs.     Last refill 2/6/25 Adderall 25 mg, #90     OARRS:  Allyson Valle MD on 5/1/2025  9:36 AM  I have personally reviewed the OARRS report for Trisha Taylor. I have considered the risks of abuse, dependence, addiction and diversion and I believe that it is clinically appropriate for Trisha Taylor to be prescribed this medication    Is the patient prescribed a combination of a benzodiazepine and opioid?  No    Last Urine Drug Screen / ordered today: No  Recent Results (from the past 8760 hours)   Amphetamine Confirm, Urine    Collection Time: 10/21/24 11:25 AM   Result Value Ref Range    Methamphetamine Quant, Ur <200 ng/mL    MDA, Urine <200 ng/mL    MDEA, Urine <200 ng/mL    Phentermine,Urine <200 ng/mL    Amphetamines,Urine >5000 ng/mL    MDMA, Urine <200 ng/mL   Drug Screen, Urine With Reflex to Confirmation    Collection Time: 10/21/24 11:25 AM   Result Value Ref Range    Amphetamine Screen, Urine Presumptive Positive (A) Presumptive Negative    Barbiturate Screen, Urine Presumptive Negative Presumptive Negative    Benzodiazepines Screen, Urine Presumptive Negative Presumptive Negative    Cannabinoid Screen, Urine Presumptive Negative Presumptive Negative    Cocaine Metabolite Screen, Urine Presumptive Negative Presumptive Negative    Fentanyl Screen, Urine Presumptive Negative Presumptive Negative    Opiate Screen, Urine Presumptive Negative Presumptive Negative    Oxycodone Screen, Urine Presumptive Negative Presumptive Negative    PCP Screen, Urine Presumptive Negative Presumptive Negative    Methadone Screen, Urine Presumptive Negative Presumptive Negative     Results are as expected.          Controlled Substance Agreement:  Date of the Last Agreement: 10/21/24  Reviewed Controlled Substance Agreement including but not limited to the benefits, risks, and alternatives to treatment with a Controlled Substance medication(s).    Stimulants:   What is the patient's goal of therapy? Improved focus and concentration  Is this being achieved with current treatment? yes    Activities of Daily Living:   Is your overall impression that this patient is benefiting (symptom reduction outweighs side effects) from stimulant therapy? Yes     1. Physical Functioning: Better  2. Family Relationship: Same  3. Social Relationship: Same  4. Mood: Better  5. Sleep Patterns: Same  6. Overall Function: Better      REVIEW OF SYSTEMS  Review of Systems   Constitutional:  Negative for chills, fatigue and fever.   HENT:  Positive for rhinorrhea.    Respiratory:  Negative for cough and shortness of breath.    Cardiovascular:  Negative for chest pain, palpitations and leg swelling.   Gastrointestinal:  Positive for nausea. Negative for abdominal pain, constipation, diarrhea and vomiting.   Musculoskeletal:  Negative for arthralgias, back pain and gait problem.   Neurological:  Negative for dizziness, light-headedness and headaches.   Psychiatric/Behavioral:  Negative for confusion and dysphoric mood. The patient is not nervous/anxious.        ALLERGIES  Amoxicillin-pot clavulanate, Levofloxacin, Sulfa (sulfonamide antibiotics), and Sulfamethoxazole-trimethoprim    MEDICATIONS  Current Outpatient Medications   Medication Instructions    albuterol 90 mcg/actuation inhaler 1-2 puffs, inhalation, Every 4 hours PRN    amphetamine-dextroamphetamine XR (Adderall XR) 25 mg 24 hr capsule 25 mg, oral, Every morning    L. acidophilus/Bifid. animalis 32 billion cell capsule Take by mouth.    metoprolol succinate XL (TOPROL-XL) 25 mg, oral, Daily    multivitamin-Ca-iron-minerals (Tab-A-Alice Womens) 27-0.4 mg tablet 1 tablet, Daily     "ondansetron ODT (ZOFRAN-ODT) 4 mg, oral, Every 8 hours PRN    pantoprazole (PROTONIX) 40 mg, oral, Daily    rizatriptan (MAXALT) 10 mg, oral, Once as needed, TAKE 1 TABLET AT ONSET OF HEADACHE. MAY REPEAT EVERY 2 HOURS AS NEEDED. MAX 3 TABS IN 24 HOURS.    semaglutide 2 mg/dose (8 mg/3 mL) pen injector INJECT 15 UNITS SUBCUTANEOUSLY ONCE A WEEK       TOBACCO USE  Tobacco Use History[1]    DEPRESSION SCREEN  Over the past 2 weeks, how often have you been bothered by any of the following problems?  Little interest or pleasure in doing things: Not at all  Feeling down, depressed, or hopeless: Not at all    SURGICAL HISTORY  Past Surgical History:  02/26/2014: MOUTH SURGERY      Comment:  Oral Surgery Tooth Extraction  02/29/2020: OTHER SURGICAL HISTORY      Comment:  Esophagogastroduodenoscopy  01/20/2021: OTHER SURGICAL HISTORY      Comment:  Endometrial ablation       OBJECTIVE    /80   Pulse 91   Temp 36.2 °C (97.1 °F)   Ht 1.6 m (5' 3\")   Wt 64.4 kg (142 lb)   BMI 25.15 kg/m²    BMI: Estimated body mass index is 25.15 kg/m² as calculated from the following:    Height as of this encounter: 1.6 m (5' 3\").    Weight as of this encounter: 64.4 kg (142 lb).    BP Readings from Last 3 Encounters:   05/01/25 113/80   01/20/25 116/84   10/21/24 126/79      Wt Readings from Last 3 Encounters:   05/01/25 64.4 kg (142 lb)   01/20/25 64.5 kg (142 lb 4.8 oz)   10/21/24 66 kg (145 lb 6.4 oz)        PHYSICAL EXAM  Physical Exam  Constitutional:       Appearance: Normal appearance.   HENT:      Head: Normocephalic and atraumatic.   Cardiovascular:      Rate and Rhythm: Normal rate and regular rhythm.      Heart sounds: No murmur heard.  Pulmonary:      Effort: Pulmonary effort is normal. No respiratory distress.      Breath sounds: Normal breath sounds. No wheezing.   Abdominal:      General: There is no distension.      Palpations: Abdomen is soft.      Tenderness: There is no abdominal tenderness.   Musculoskeletal: "      Right lower leg: No edema.      Left lower leg: No edema.   Neurological:      General: No focal deficit present.      Mental Status: She is alert and oriented to person, place, and time. Mental status is at baseline.      Gait: Gait normal.   Psychiatric:         Mood and Affect: Mood normal.         Behavior: Behavior normal.         Thought Content: Thought content normal.         Judgment: Judgment normal.          ASSESSMENT AND PLAN  Assessment/Plan   Problem List Items Addressed This Visit       Attention deficit disorder (ADD) - Primary    Relevant Medications    amphetamine-dextroamphetamine XR (Adderall XR) 25 mg 24 hr capsule    Benign essential hypertension    Class 1 obesity due to excess calories with serious comorbidity and body mass index (BMI) of 31.0 to 31.9 in adult    High risk medication use     Other Visit Diagnoses         Nausea        Relevant Medications    ondansetron ODT (Zofran-ODT) 4 mg disintegrating tablet             Attention Deficit Disorder - has been on Adderall for several years with good results.  Controlled Substance Agreement completed 10/21/24.  OARRS reviewed.  Urine drug screen 10/21/24.   Refill sent.     Obesity, BMI 31 with fatty liver, difficulty losing weight - TSH normal Jan 2024.  She is following a low calorie diet and exercises regularly.  Obesity runs in family.  GLP1 not covered by insurance.  She is getting semaglutide from compounding pharmacy and it is working well for her.  Rx sent for Zofran to help with side effect of nausea.      Hypertension - controlled on current medication.     Follow-up in 90 days.              [1]   Social History  Tobacco Use   Smoking Status Every Day    Types: Cigarettes   Smokeless Tobacco Never

## 2025-07-06 DIAGNOSIS — I10 ESSENTIAL (PRIMARY) HYPERTENSION: ICD-10-CM

## 2025-07-06 DIAGNOSIS — K21.9 GASTRO-ESOPHAGEAL REFLUX DISEASE WITHOUT ESOPHAGITIS: ICD-10-CM

## 2025-07-06 RX ORDER — PANTOPRAZOLE SODIUM 40 MG/1
40 TABLET, DELAYED RELEASE ORAL DAILY
Qty: 90 TABLET | Refills: 3 | Status: SHIPPED | OUTPATIENT
Start: 2025-07-06

## 2025-07-06 RX ORDER — METOPROLOL SUCCINATE 25 MG/1
25 TABLET, EXTENDED RELEASE ORAL DAILY
Qty: 90 TABLET | Refills: 3 | Status: SHIPPED | OUTPATIENT
Start: 2025-07-06

## 2025-08-05 ENCOUNTER — APPOINTMENT (OUTPATIENT)
Dept: PRIMARY CARE | Facility: CLINIC | Age: 40
End: 2025-08-05
Payer: COMMERCIAL

## 2025-08-05 VITALS
TEMPERATURE: 97.2 F | DIASTOLIC BLOOD PRESSURE: 76 MMHG | BODY MASS INDEX: 27.98 KG/M2 | HEART RATE: 89 BPM | HEIGHT: 63 IN | SYSTOLIC BLOOD PRESSURE: 114 MMHG | OXYGEN SATURATION: 98 % | WEIGHT: 157.9 LBS

## 2025-08-05 DIAGNOSIS — E66.09 CLASS 1 OBESITY DUE TO EXCESS CALORIES WITH SERIOUS COMORBIDITY AND BODY MASS INDEX (BMI) OF 31.0 TO 31.9 IN ADULT: ICD-10-CM

## 2025-08-05 DIAGNOSIS — E66.811 CLASS 1 OBESITY DUE TO EXCESS CALORIES WITH SERIOUS COMORBIDITY AND BODY MASS INDEX (BMI) OF 31.0 TO 31.9 IN ADULT: ICD-10-CM

## 2025-08-05 DIAGNOSIS — Z79.899 HIGH RISK MEDICATION USE: ICD-10-CM

## 2025-08-05 DIAGNOSIS — F98.8 ATTENTION DEFICIT DISORDER, UNSPECIFIED TYPE: Primary | ICD-10-CM

## 2025-08-05 DIAGNOSIS — I10 BENIGN ESSENTIAL HYPERTENSION: ICD-10-CM

## 2025-08-05 PROBLEM — N30.90 CYSTITIS: Status: RESOLVED | Noted: 2024-07-25 | Resolved: 2025-08-05

## 2025-08-05 PROCEDURE — 3074F SYST BP LT 130 MM HG: CPT | Performed by: INTERNAL MEDICINE

## 2025-08-05 PROCEDURE — 3008F BODY MASS INDEX DOCD: CPT | Performed by: INTERNAL MEDICINE

## 2025-08-05 PROCEDURE — 99213 OFFICE O/P EST LOW 20 MIN: CPT | Performed by: INTERNAL MEDICINE

## 2025-08-05 PROCEDURE — 3078F DIAST BP <80 MM HG: CPT | Performed by: INTERNAL MEDICINE

## 2025-08-05 RX ORDER — DEXTROAMPHETAMINE SACCHARATE, AMPHETAMINE ASPARTATE MONOHYDRATE, DEXTROAMPHETAMINE SULFATE AND AMPHETAMINE SULFATE 6.25; 6.25; 6.25; 6.25 MG/1; MG/1; MG/1; MG/1
25 CAPSULE, EXTENDED RELEASE ORAL EVERY MORNING
Qty: 90 CAPSULE | Refills: 0 | Status: SHIPPED | OUTPATIENT
Start: 2025-08-05

## 2025-08-05 ASSESSMENT — ENCOUNTER SYMPTOMS
FEVER: 0
PALPITATIONS: 0
HEADACHES: 0
VOMITING: 0
CHILLS: 0
CONFUSION: 0
NAUSEA: 0
SHORTNESS OF BREATH: 0
ARTHRALGIAS: 0
COUGH: 0
DIZZINESS: 0
CONSTIPATION: 0
LIGHT-HEADEDNESS: 0
NERVOUS/ANXIOUS: 0
DYSPHORIC MOOD: 0
DIARRHEA: 0
BACK PAIN: 0
ABDOMINAL PAIN: 0
FATIGUE: 0

## 2025-08-05 NOTE — PROGRESS NOTES
CHIEF COMPLAINT  ADD    HISTORY OF PRESENT ILLNESS  Trisha Taylor is a 40 y.o. female presents today for follow up of ADD    HPI    Doing well on Adderall, needs next Rx.  Took a break from semaglutide to enjoy some events over the summer, gained a few pounds.  Plans to restart the injections to use what she has remaining.   Prone to weak ankles, sometimes rolls ankles / falls.  States she has always been able to hyperextend elbow and knees.  She is concerned about Dante Danlos syndrome.  Does not have any family history of collagen / vascular disorders.  No personal history of joint dislocations.    Last Rx 5/8/25 Adderall #90     OARRS:  Allyson Valle MD on 8/5/2025  8:22 AM  I have personally reviewed the OARRS report for Trisha Taylor. I have considered the risks of abuse, dependence, addiction and diversion and I believe that it is clinically appropriate for Trisha Taylor to be prescribed this medication    Is the patient prescribed a combination of a benzodiazepine and opioid?  No    Last Urine Drug Screen / ordered today: No  Recent Results (from the past 8760 hours)   Amphetamine Confirm, Urine    Collection Time: 10/21/24 11:25 AM   Result Value Ref Range    Methamphetamine Quant, Ur <200 ng/mL    MDA, Urine <200 ng/mL    MDEA, Urine <200 ng/mL    Phentermine,Urine <200 ng/mL    Amphetamines,Urine >5000 ng/mL    MDMA, Urine <200 ng/mL   Drug Screen, Urine With Reflex to Confirmation    Collection Time: 10/21/24 11:25 AM   Result Value Ref Range    Amphetamine Screen, Urine Presumptive Positive (A) Presumptive Negative    Barbiturate Screen, Urine Presumptive Negative Presumptive Negative    Benzodiazepines Screen, Urine Presumptive Negative Presumptive Negative    Cannabinoid Screen, Urine Presumptive Negative Presumptive Negative    Cocaine Metabolite Screen, Urine Presumptive Negative Presumptive Negative    Fentanyl Screen, Urine Presumptive Negative Presumptive Negative    Opiate Screen, Urine  Presumptive Negative Presumptive Negative    Oxycodone Screen, Urine Presumptive Negative Presumptive Negative    PCP Screen, Urine Presumptive Negative Presumptive Negative    Methadone Screen, Urine Presumptive Negative Presumptive Negative     Results are as expected.         Controlled Substance Agreement:  Date of the Last Agreement: 10/21/24  Reviewed Controlled Substance Agreement including but not limited to the benefits, risks, and alternatives to treatment with a Controlled Substance medication(s).    Stimulants:   What is the patient's goal of therapy? Improved focus and concentration  Is this being achieved with current treatment? yes    Activities of Daily Living:   Is your overall impression that this patient is benefiting (symptom reduction outweighs side effects) from stimulant therapy? Yes     1. Physical Functioning: Same  2. Family Relationship: Better  3. Social Relationship: Better  4. Mood: Same  5. Sleep Patterns: Better  6. Overall Function: Better      REVIEW OF SYSTEMS  Review of Systems   Constitutional:  Negative for chills, fatigue and fever.   Respiratory:  Negative for cough and shortness of breath.    Cardiovascular:  Negative for chest pain, palpitations and leg swelling.   Gastrointestinal:  Negative for abdominal pain, constipation, diarrhea, nausea and vomiting.   Musculoskeletal:  Negative for arthralgias, back pain and gait problem.   Skin:  Negative for rash.   Neurological:  Negative for dizziness, light-headedness and headaches.   Psychiatric/Behavioral:  Negative for confusion and dysphoric mood. The patient is not nervous/anxious.        ALLERGIES  Amoxicillin-pot clavulanate, Levofloxacin, Sulfa (sulfonamide antibiotics), and Sulfamethoxazole-trimethoprim    MEDICATIONS  Current Outpatient Medications   Medication Instructions    albuterol 90 mcg/actuation inhaler 1-2 puffs, inhalation, Every 4 hours PRN    amphetamine-dextroamphetamine XR (Adderall XR) 25 mg 24 hr capsule  "25 mg, oral, Every morning    L. acidophilus/Bifid. animalis 32 billion cell capsule Take by mouth.    metoprolol succinate XL (TOPROL-XL) 25 mg, oral, Daily    multivitamin-Ca-iron-minerals (Tab-A-Alice Womens) 27-0.4 mg tablet 1 tablet, Daily    pantoprazole (PROTONIX) 40 mg, oral, Daily    rizatriptan (MAXALT) 10 mg, oral, Once as needed, TAKE 1 TABLET AT ONSET OF HEADACHE. MAY REPEAT EVERY 2 HOURS AS NEEDED. MAX 3 TABS IN 24 HOURS.    semaglutide 2 mg/dose (8 mg/3 mL) pen injector INJECT 15 UNITS SUBCUTANEOUSLY ONCE A WEEK       TOBACCO USE  Tobacco Use History[1]    DEPRESSION SCREEN  Over the past 2 weeks, how often have you been bothered by any of the following problems?  Little interest or pleasure in doing things: Not at all  Feeling down, depressed, or hopeless: Not at all    SURGICAL HISTORY  Past Surgical History:  02/26/2014: MOUTH SURGERY      Comment:  Oral Surgery Tooth Extraction  02/29/2020: OTHER SURGICAL HISTORY      Comment:  Esophagogastroduodenoscopy  01/20/2021: OTHER SURGICAL HISTORY      Comment:  Endometrial ablation       OBJECTIVE    /76   Pulse 89   Temp 36.2 °C (97.2 °F)   Ht 1.6 m (5' 3\")   Wt 71.6 kg (157 lb 14.4 oz)   SpO2 98%   BMI 27.97 kg/m²    BMI: Estimated body mass index is 27.97 kg/m² as calculated from the following:    Height as of this encounter: 1.6 m (5' 3\").    Weight as of this encounter: 71.6 kg (157 lb 14.4 oz).    BP Readings from Last 3 Encounters:   08/05/25 114/76   05/01/25 113/80   01/20/25 116/84      Wt Readings from Last 3 Encounters:   08/05/25 71.6 kg (157 lb 14.4 oz)   05/01/25 64.4 kg (142 lb)   01/20/25 64.5 kg (142 lb 4.8 oz)        PHYSICAL EXAM  Physical Exam  Constitutional:       Appearance: Normal appearance.   HENT:      Head: Normocephalic and atraumatic.     Cardiovascular:      Rate and Rhythm: Normal rate and regular rhythm.      Heart sounds: No murmur heard.  Pulmonary:      Effort: Pulmonary effort is normal. No respiratory " distress.      Breath sounds: Normal breath sounds. No wheezing.   Abdominal:      General: There is no distension.      Palpations: Abdomen is soft.      Tenderness: There is no abdominal tenderness.     Musculoskeletal:      Right lower leg: No edema.      Left lower leg: No edema.      Comments: No hyperextensibility of the thumbs, unable to touch toes     Neurological:      General: No focal deficit present.      Mental Status: She is alert and oriented to person, place, and time. Mental status is at baseline.      Gait: Gait normal.     Psychiatric:         Mood and Affect: Mood normal.         Behavior: Behavior normal.         Thought Content: Thought content normal.         Judgment: Judgment normal.          ASSESSMENT AND PLAN  Assessment/Plan   Problem List Items Addressed This Visit       Attention deficit disorder (ADD) - Primary    Relevant Medications    amphetamine-dextroamphetamine XR (Adderall XR) 25 mg 24 hr capsule    Benign essential hypertension    Class 1 obesity due to excess calories with serious comorbidity and body mass index (BMI) of 31.0 to 31.9 in adult    High risk medication use     Attention Deficit Disorder - has been on Adderall for several years with good results.  Controlled Substance Agreement completed 10/21/24.  OARRS reviewed.  Urine drug screen 10/21/24.   Refill sent.     Obesity, BMI 31 with fatty liver, difficulty losing weight - Has lost weight with semaglutide.  She does have some nausea as side effect, controlled with Zofran.  She has taken a break from the med for several weeks due to summer activities / parties, etc.  Plans to restart the med that she has remaining.      Hypertension - controlled on current medication.     No history of joint dislocations.  Minimal hyperextensibility.  No history of vascular dissection, organ rupture, or skin fragility.  EDS unlikely.  Can do exercises to strengthen ankles.      Follow-up in 90 days.                    [1]   Social  History  Tobacco Use   Smoking Status Every Day    Types: Cigarettes   Smokeless Tobacco Never

## 2025-10-30 ENCOUNTER — APPOINTMENT (OUTPATIENT)
Dept: PRIMARY CARE | Facility: CLINIC | Age: 40
End: 2025-10-30
Payer: COMMERCIAL